# Patient Record
Sex: MALE | Race: WHITE | NOT HISPANIC OR LATINO | Employment: OTHER | URBAN - METROPOLITAN AREA
[De-identification: names, ages, dates, MRNs, and addresses within clinical notes are randomized per-mention and may not be internally consistent; named-entity substitution may affect disease eponyms.]

---

## 2019-04-22 ENCOUNTER — OFFICE VISIT (OUTPATIENT)
Dept: URGENT CARE | Facility: CLINIC | Age: 69
End: 2019-04-22
Payer: MEDICARE

## 2019-04-22 VITALS
HEART RATE: 60 BPM | RESPIRATION RATE: 20 BRPM | WEIGHT: 232 LBS | DIASTOLIC BLOOD PRESSURE: 85 MMHG | OXYGEN SATURATION: 98 % | BODY MASS INDEX: 35.16 KG/M2 | SYSTOLIC BLOOD PRESSURE: 177 MMHG | HEIGHT: 68 IN | TEMPERATURE: 98.1 F

## 2019-04-22 DIAGNOSIS — B96.89 ACUTE BACTERIAL BRONCHITIS: Primary | ICD-10-CM

## 2019-04-22 DIAGNOSIS — J20.8 ACUTE BACTERIAL BRONCHITIS: Primary | ICD-10-CM

## 2019-04-22 PROCEDURE — 99203 OFFICE O/P NEW LOW 30 MIN: CPT | Performed by: EMERGENCY MEDICINE

## 2019-04-22 PROCEDURE — G0463 HOSPITAL OUTPT CLINIC VISIT: HCPCS | Performed by: EMERGENCY MEDICINE

## 2019-04-22 RX ORDER — UBIDECARENONE 75 MG
CAPSULE ORAL DAILY
COMMUNITY

## 2019-04-22 RX ORDER — MAGNESIUM 30 MG
30 TABLET ORAL 2 TIMES DAILY
COMMUNITY

## 2019-04-22 RX ORDER — PANTOPRAZOLE SODIUM 40 MG/1
TABLET, DELAYED RELEASE ORAL
COMMUNITY
Start: 2019-03-20

## 2019-04-22 RX ORDER — SIMVASTATIN 20 MG
TABLET ORAL
COMMUNITY
Start: 2019-03-20

## 2019-04-22 RX ORDER — AZITHROMYCIN 250 MG/1
TABLET, FILM COATED ORAL
Qty: 6 TABLET | Refills: 0 | Status: SHIPPED | OUTPATIENT
Start: 2019-04-22 | End: 2019-04-26

## 2019-04-22 RX ORDER — CANDESARTAN CILEXETIL AND HYDROCHLOROTHIAZIDE 32; 25 MG/1; MG/1
TABLET ORAL
COMMUNITY
Start: 2019-03-20

## 2019-04-22 RX ORDER — INSULIN GLARGINE 100 [IU]/ML
INJECTION, SOLUTION SUBCUTANEOUS
COMMUNITY
Start: 2019-03-20

## 2019-04-22 RX ORDER — PREDNISONE 10 MG/1
TABLET ORAL
Qty: 27 TABLET | Refills: 0 | Status: SHIPPED | OUTPATIENT
Start: 2019-04-22

## 2019-04-22 RX ORDER — INSULIN ASPART 100 [IU]/ML
INJECTION, SOLUTION INTRAVENOUS; SUBCUTANEOUS
COMMUNITY
Start: 2019-03-20

## 2019-06-11 ENCOUNTER — RX ONLY (RX ONLY)
Age: 69
End: 2019-06-11

## 2019-06-11 ENCOUNTER — DOCTOR'S OFFICE (OUTPATIENT)
Dept: URBAN - NONMETROPOLITAN AREA CLINIC 1 | Facility: CLINIC | Age: 69
Setting detail: OPHTHALMOLOGY
End: 2019-06-11
Payer: COMMERCIAL

## 2019-06-11 DIAGNOSIS — H25.013: ICD-10-CM

## 2019-06-11 DIAGNOSIS — H35.373: ICD-10-CM

## 2019-06-11 DIAGNOSIS — H53.2: ICD-10-CM

## 2019-06-11 DIAGNOSIS — E11.9: ICD-10-CM

## 2019-06-11 PROCEDURE — 99204 OFFICE O/P NEW MOD 45 MIN: CPT | Performed by: OPHTHALMOLOGY

## 2019-06-11 PROCEDURE — 92134 CPTRZ OPH DX IMG PST SGM RTA: CPT | Performed by: OPHTHALMOLOGY

## 2019-06-11 ASSESSMENT — REFRACTION_MANIFEST
OS_VA3: 20/
OS_VA2: 20/
OD_VA2: 20/
OU_VA: 20/
OD_VA1: 20/
OS_VA1: 20/
OU_VA: 20/
OD_VA3: 20/
OD_VA3: 20/
OS_VA1: 20/
OS_VA2: 20/
OS_VA3: 20/
OD_VA2: 20/
OD_VA1: 20/

## 2019-06-11 ASSESSMENT — REFRACTION_CURRENTRX
OD_VPRISM: BU
OD_OVR_VA: 20/
OS_ADD: +2.25
OS_VPRISM: BD
OD_OVR_VA: 20/
OS_SPHERE: -0.75
OS_OVR_VA: 20/
OD_OVR_VA: 20/
OS_CYLINDER: -0.25
OD_ADD: +2.25
OS_OVR_VA: 20/
OD_CYLINDER: -1.75
OS_OVR_VA: 20/
OD_SPHERE: -0.75
OD_VPRISM_DIRECTION: PROGS
OD_AXIS: 005
OS_VPRISM_DIRECTION: PROGS
OS_AXIS: 112

## 2019-06-11 ASSESSMENT — CONFRONTATIONAL VISUAL FIELD TEST (CVF)
OD_FINDINGS: FULL
OS_FINDINGS: FULL

## 2019-06-11 ASSESSMENT — SPHEQUIV_DERIVED
OS_SPHEQUIV: -0.25
OD_SPHEQUIV: -0.5

## 2019-06-11 ASSESSMENT — VISUAL ACUITY
OS_BCVA: 20/30-2
OD_BCVA: 20/30-1

## 2019-06-11 ASSESSMENT — REFRACTION_AUTOREFRACTION
OD_CYLINDER: -1.00
OD_AXIS: 070
OS_AXIS: 072
OD_SPHERE: 0.00
OS_CYLINDER: -1.00
OS_SPHERE: +0.25

## 2019-06-21 ENCOUNTER — OFFICE VISIT (OUTPATIENT)
Dept: URGENT CARE | Facility: CLINIC | Age: 69
End: 2019-06-21
Payer: MEDICARE

## 2019-06-21 VITALS
BODY MASS INDEX: 34.86 KG/M2 | HEIGHT: 68 IN | RESPIRATION RATE: 22 BRPM | SYSTOLIC BLOOD PRESSURE: 181 MMHG | DIASTOLIC BLOOD PRESSURE: 88 MMHG | WEIGHT: 230 LBS | TEMPERATURE: 98.7 F | HEART RATE: 71 BPM | OXYGEN SATURATION: 95 %

## 2019-06-21 DIAGNOSIS — J20.8 ACUTE BACTERIAL BRONCHITIS: Primary | ICD-10-CM

## 2019-06-21 DIAGNOSIS — B96.89 ACUTE BACTERIAL BRONCHITIS: Primary | ICD-10-CM

## 2019-06-21 PROCEDURE — G0463 HOSPITAL OUTPT CLINIC VISIT: HCPCS | Performed by: EMERGENCY MEDICINE

## 2019-06-21 PROCEDURE — 99213 OFFICE O/P EST LOW 20 MIN: CPT | Performed by: EMERGENCY MEDICINE

## 2019-06-21 RX ORDER — PREDNISONE 10 MG/1
TABLET ORAL
Qty: 24 TABLET | Refills: 0 | Status: SHIPPED | OUTPATIENT
Start: 2019-06-21

## 2019-06-21 RX ORDER — AZITHROMYCIN 250 MG/1
TABLET, FILM COATED ORAL
Qty: 6 TABLET | Refills: 0 | Status: SHIPPED | OUTPATIENT
Start: 2019-06-21 | End: 2019-06-25

## 2019-06-21 RX ORDER — ALBUTEROL SULFATE 90 UG/1
2 AEROSOL, METERED RESPIRATORY (INHALATION) EVERY 6 HOURS PRN
Qty: 8.5 G | Refills: 0 | Status: SHIPPED | OUTPATIENT
Start: 2019-06-21

## 2019-08-15 ENCOUNTER — DOCTOR'S OFFICE (OUTPATIENT)
Dept: URBAN - NONMETROPOLITAN AREA CLINIC 1 | Facility: CLINIC | Age: 69
Setting detail: OPHTHALMOLOGY
End: 2019-08-15
Payer: COMMERCIAL

## 2019-08-15 DIAGNOSIS — H53.2: ICD-10-CM

## 2019-08-15 DIAGNOSIS — H35.373: ICD-10-CM

## 2019-08-15 DIAGNOSIS — E11.9: ICD-10-CM

## 2019-08-15 DIAGNOSIS — H49.12: ICD-10-CM

## 2019-08-15 DIAGNOSIS — H25.013: ICD-10-CM

## 2019-08-15 PROCEDURE — 92012 INTRM OPH EXAM EST PATIENT: CPT | Performed by: OPHTHALMOLOGY

## 2019-08-15 PROCEDURE — 92060 SENSORIMOTOR EXAMINATION: CPT | Performed by: OPHTHALMOLOGY

## 2019-08-15 PROCEDURE — FRESNELPRI FRESNEL PRISM NEW: Performed by: OPHTHALMOLOGY

## 2019-08-15 ASSESSMENT — CONFRONTATIONAL VISUAL FIELD TEST (CVF)
OD_FINDINGS: FULL
OS_FINDINGS: FULL

## 2019-08-22 ASSESSMENT — REFRACTION_MANIFEST
OS_VA2: 20/
OS_VA1: 20/
OD_VA2: 20/
OD_VA1: 20/
OS_VA1: 20/
OS_VA3: 20/
OU_VA: 20/
OD_VA3: 20/
OD_VA2: 20/
OD_VA1: 20/
OS_VA3: 20/
OD_VA3: 20/
OU_VA: 20/
OS_VA2: 20/

## 2019-08-22 ASSESSMENT — REFRACTION_CURRENTRX
OD_OVR_VA: 20/
OS_CYLINDER: -0.25
OD_VPRISM: BU
OD_VPRISM_DIRECTION: PROGS
OD_ADD: +2.25
OS_ADD: +2.25
OS_OVR_VA: 20/
OS_SPHERE: -0.75
OD_CYLINDER: -1.75
OS_VPRISM_DIRECTION: PROGS
OS_AXIS: 112
OS_VPRISM: BD
OD_SPHERE: -0.75
OS_OVR_VA: 20/
OS_OVR_VA: 20/
OD_OVR_VA: 20/
OD_AXIS: 005
OD_OVR_VA: 20/

## 2019-08-22 ASSESSMENT — VISUAL ACUITY
OS_BCVA: 20/25-1
OD_BCVA: 20/30-1

## 2019-08-22 ASSESSMENT — REFRACTION_AUTOREFRACTION
OD_SPHERE: -0.75
OS_AXIS: 092
OS_CYLINDER: -0.75
OD_CYLINDER: -0.75
OD_AXIS: 030
OS_SPHERE: -0.25

## 2019-08-22 ASSESSMENT — SPHEQUIV_DERIVED
OD_SPHEQUIV: -1.125
OS_SPHEQUIV: -0.625

## 2019-09-19 ENCOUNTER — DOCTOR'S OFFICE (OUTPATIENT)
Dept: URBAN - NONMETROPOLITAN AREA CLINIC 1 | Facility: CLINIC | Age: 69
Setting detail: OPHTHALMOLOGY
End: 2019-09-19
Payer: COMMERCIAL

## 2019-09-19 DIAGNOSIS — H49.12: ICD-10-CM

## 2019-09-19 DIAGNOSIS — H53.2: ICD-10-CM

## 2019-09-19 DIAGNOSIS — H25.013: ICD-10-CM

## 2019-09-19 DIAGNOSIS — H35.373: ICD-10-CM

## 2019-09-19 DIAGNOSIS — E11.9: ICD-10-CM

## 2019-09-19 PROCEDURE — 92012 INTRM OPH EXAM EST PATIENT: CPT | Performed by: OPHTHALMOLOGY

## 2019-09-19 ASSESSMENT — REFRACTION_MANIFEST
OS_VA1: 20/
OS_VA1: 20/
OS_VA3: 20/
OD_VA3: 20/
OS_VA2: 20/
OD_VA1: 20/
OU_VA: 20/
OD_VA2: 20/
OS_VA3: 20/
OD_VA3: 20/
OU_VA: 20/
OD_VA1: 20/
OS_VA2: 20/
OD_VA2: 20/

## 2019-09-19 ASSESSMENT — REFRACTION_AUTOREFRACTION
OD_CYLINDER: -0.75
OD_AXIS: 030
OS_CYLINDER: -0.75
OD_SPHERE: -0.75
OS_SPHERE: -0.25
OS_AXIS: 092

## 2019-09-19 ASSESSMENT — REFRACTION_CURRENTRX
OS_VPRISM: BD
OD_SPHERE: -0.75
OD_VPRISM_DIRECTION: PROGS
OD_VPRISM: BU
OD_CYLINDER: -1.75
OS_AXIS: 112
OS_OVR_VA: 20/
OD_OVR_VA: 20/
OD_AXIS: 005
OD_ADD: +2.25
OS_CYLINDER: -0.25
OS_OVR_VA: 20/
OS_SPHERE: -0.75
OS_ADD: +2.25
OD_OVR_VA: 20/
OS_OVR_VA: 20/
OS_VPRISM_DIRECTION: PROGS
OD_OVR_VA: 20/

## 2019-09-19 ASSESSMENT — VISUAL ACUITY
OS_BCVA: 20/30-1
OD_BCVA: 20/25-1

## 2019-09-19 ASSESSMENT — SPHEQUIV_DERIVED
OD_SPHEQUIV: -1.125
OS_SPHEQUIV: -0.625

## 2019-10-04 ENCOUNTER — DOCTOR'S OFFICE (OUTPATIENT)
Dept: URBAN - NONMETROPOLITAN AREA CLINIC 1 | Facility: CLINIC | Age: 69
Setting detail: OPHTHALMOLOGY
End: 2019-10-04

## 2019-10-04 DIAGNOSIS — H52.223: ICD-10-CM

## 2019-10-04 DIAGNOSIS — H52.4: ICD-10-CM

## 2019-10-04 PROCEDURE — 92015 DETERMINE REFRACTIVE STATE: CPT | Performed by: OPTOMETRIST

## 2019-10-04 ASSESSMENT — REFRACTION_CURRENTRX
OS_SPHERE: -0.75
OD_VPRISM: BU
OS_VPRISM: BD
OD_ADD: +2.25
OS_OVR_VA: 20/
OD_AXIS: 005
OD_VPRISM_DIRECTION: PROGS
OD_CYLINDER: -1.75
OD_OVR_VA: 20/
OS_AXIS: 112
OS_OVR_VA: 20/
OD_SPHERE: -0.75
OS_VPRISM_DIRECTION: PROGS
OD_OVR_VA: 20/
OS_OVR_VA: 20/
OD_OVR_VA: 20/
OS_CYLINDER: -0.25
OS_ADD: +2.25

## 2019-10-04 ASSESSMENT — REFRACTION_AUTOREFRACTION
OD_AXIS: 076
OS_CYLINDER: -0.50
OS_SPHERE: 0.00
OD_CYLINDER: -0.50
OS_AXIS: 071
OD_SPHERE: -0.50

## 2019-10-04 ASSESSMENT — REFRACTION_MANIFEST
OD_VA2: 20/30+2
OD_ADD: +2.50
OD_SPHERE: -0.50
OS_SPHERE: -0.25
OS_VA1: 20/
OS_ADD: +2.50
OS_VA3: 20/
OS_VA3: 20/
OU_VA: 20/
OD_VA2: 20/
OD_VA3: 20/
OS_VA1: 20/25-2
OD_VA1: 20/
OS_CYLINDER: -0.75
OU_VA: 20/
OD_VA1: 20/30+2
OD_AXIS: 075
OD_VA3: 20/
OS_VA2: 20/
OS_VA2: 20/25-2
OS_AXIS: 070
OD_CYLINDER: -1.25

## 2019-10-04 ASSESSMENT — SPHEQUIV_DERIVED
OS_SPHEQUIV: -0.25
OD_SPHEQUIV: -1.125
OS_SPHEQUIV: -0.625
OD_SPHEQUIV: -0.75

## 2019-10-04 ASSESSMENT — VISUAL ACUITY
OS_BCVA: 20/40
OD_BCVA: 20/30-1

## 2019-10-09 ENCOUNTER — DOCTOR'S OFFICE (OUTPATIENT)
Dept: URBAN - NONMETROPOLITAN AREA CLINIC 1 | Facility: CLINIC | Age: 69
Setting detail: OPHTHALMOLOGY
End: 2019-10-09
Payer: COMMERCIAL

## 2019-10-09 ENCOUNTER — OPTICAL OFFICE (OUTPATIENT)
Dept: URBAN - NONMETROPOLITAN AREA CLINIC 4 | Facility: CLINIC | Age: 69
Setting detail: OPHTHALMOLOGY
End: 2019-10-09

## 2019-10-09 DIAGNOSIS — E11.9: ICD-10-CM

## 2019-10-09 DIAGNOSIS — H25.013: ICD-10-CM

## 2019-10-09 DIAGNOSIS — H53.2: ICD-10-CM

## 2019-10-09 DIAGNOSIS — H52.223: ICD-10-CM

## 2019-10-09 DIAGNOSIS — H49.12: ICD-10-CM

## 2019-10-09 PROCEDURE — V2744 TINT PHOTOCHROMATIC LENS/ES: HCPCS | Performed by: OPHTHALMOLOGY

## 2019-10-09 PROCEDURE — V2781 PROGRESSIVE LENS PER LENS: HCPCS | Performed by: OPHTHALMOLOGY

## 2019-10-09 PROCEDURE — V2715 PRISM LENS/ES: HCPCS | Performed by: OPHTHALMOLOGY

## 2019-10-09 PROCEDURE — V2750 ANTI-REFLECTIVE COATING: HCPCS | Performed by: OPHTHALMOLOGY

## 2019-10-09 PROCEDURE — 99213 OFFICE O/P EST LOW 20 MIN: CPT | Performed by: OPHTHALMOLOGY

## 2019-10-09 PROCEDURE — V2020 VISION SVCS FRAMES PURCHASES: HCPCS | Performed by: OPHTHALMOLOGY

## 2019-10-09 ASSESSMENT — REFRACTION_MANIFEST
OD_AXIS: 075
OD_VA2: 20/30+2
OS_VA3: 20/
OU_VA: 20/
OD_CYLINDER: -1.25
OS_CYLINDER: -0.75
OD_VA3: 20/
OS_VA3: 20/
OD_VA1: 20/30+2
OS_ADD: +2.50
OS_VPRISM: BD
OS_VA1: 20/25-2
OS_VA1: 20/
OS_SPHERE: -0.25
OS_VA2: 20/25-2
OD_ADD: +2.50
OD_VA3: 20/
OS_AXIS: 070
OD_VPRISM: BU
OS_VA2: 20/
OD_SPHERE: -0.50
OD_VA2: 20/
OU_VA: 20/
OD_VA1: 20/

## 2019-10-09 ASSESSMENT — CONFRONTATIONAL VISUAL FIELD TEST (CVF)
OD_FINDINGS: FULL
OS_FINDINGS: FULL

## 2019-10-09 ASSESSMENT — SPHEQUIV_DERIVED
OS_SPHEQUIV: -0.625
OD_SPHEQUIV: -1.125

## 2019-10-22 ASSESSMENT — REFRACTION_CURRENTRX
OS_AXIS: 112
OS_OVR_VA: 20/
OS_VPRISM: BD
OD_CYLINDER: -1.75
OS_OVR_VA: 20/
OS_VPRISM_DIRECTION: PROGS
OS_CYLINDER: -0.25
OD_SPHERE: -0.75
OD_AXIS: 005
OS_ADD: +2.25
OS_SPHERE: -0.75
OD_VPRISM_DIRECTION: PROGS
OD_OVR_VA: 20/
OS_OVR_VA: 20/
OD_VPRISM: BU
OD_ADD: +2.25

## 2019-10-22 ASSESSMENT — SPHEQUIV_DERIVED
OS_SPHEQUIV: -0.625
OD_SPHEQUIV: -1.25

## 2019-10-22 ASSESSMENT — REFRACTION_AUTOREFRACTION
OD_CYLINDER: -0.50
OS_SPHERE: -0.25
OS_AXIS: 058
OS_CYLINDER: -0.75
OD_SPHERE: -1.00
OD_AXIS: 064

## 2019-10-22 ASSESSMENT — VISUAL ACUITY
OD_BCVA: 20/30-2
OS_BCVA: 20/40

## 2019-12-11 ENCOUNTER — DOCTOR'S OFFICE (OUTPATIENT)
Dept: URBAN - NONMETROPOLITAN AREA CLINIC 1 | Facility: CLINIC | Age: 69
Setting detail: OPHTHALMOLOGY
End: 2019-12-11
Payer: COMMERCIAL

## 2019-12-11 DIAGNOSIS — H25.013: ICD-10-CM

## 2019-12-11 DIAGNOSIS — H35.373: ICD-10-CM

## 2019-12-11 DIAGNOSIS — E11.9: ICD-10-CM

## 2019-12-11 PROCEDURE — 92014 COMPRE OPH EXAM EST PT 1/>: CPT | Performed by: OPHTHALMOLOGY

## 2019-12-11 PROCEDURE — 92134 CPTRZ OPH DX IMG PST SGM RTA: CPT | Performed by: OPHTHALMOLOGY

## 2019-12-11 ASSESSMENT — REFRACTION_CURRENTRX
OS_VPRISM_DIRECTION: PROGS
OD_OVR_VA: 20/
OS_VPRISM: BD
OS_OVR_VA: 20/
OS_CYLINDER: -0.25
OD_CYLINDER: -1.75
OS_OVR_VA: 20/
OS_ADD: +2.25
OS_SPHERE: -0.75
OD_AXIS: 005
OD_ADD: +2.25
OD_VPRISM: BU
OS_AXIS: 112
OD_OVR_VA: 20/
OD_SPHERE: -0.75
OD_VPRISM_DIRECTION: PROGS
OD_OVR_VA: 20/
OS_OVR_VA: 20/

## 2019-12-11 ASSESSMENT — REFRACTION_AUTOREFRACTION
OS_CYLINDER: -0.50
OS_AXIS: 065
OD_CYLINDER: -1.50
OD_AXIS: 084
OD_SPHERE: -0.25
OS_SPHERE: -0.25

## 2019-12-11 ASSESSMENT — REFRACTION_MANIFEST
OD_VA3: 20/
OS_VA3: 20/
OS_CYLINDER: -0.75
OD_CYLINDER: -1.25
OD_VPRISM: BU
OS_VA3: 20/
OS_VA1: 20/25-2
OD_ADD: +2.50
OS_ADD: +2.50
OS_AXIS: 070
OD_SPHERE: -0.50
OS_VPRISM: BD
OD_VA1: 20/30+2
OD_VA2: 20/30+2
OD_VA1: 20/
OS_SPHERE: -0.25
OS_VA2: 20/25-2
OD_VA2: 20/
OU_VA: 20/
OS_VA1: 20/
OD_AXIS: 075
OU_VA: 20/
OS_VA2: 20/
OD_VA3: 20/

## 2019-12-11 ASSESSMENT — VISUAL ACUITY
OD_BCVA: 20/25-1
OS_BCVA: 20/40+2

## 2019-12-11 ASSESSMENT — SPHEQUIV_DERIVED
OD_SPHEQUIV: -1.125
OS_SPHEQUIV: -0.625
OS_SPHEQUIV: -0.5
OD_SPHEQUIV: -1

## 2019-12-11 ASSESSMENT — CONFRONTATIONAL VISUAL FIELD TEST (CVF)
OS_FINDINGS: FULL
OD_FINDINGS: FULL

## 2020-06-16 ENCOUNTER — DOCTOR'S OFFICE (OUTPATIENT)
Dept: URBAN - NONMETROPOLITAN AREA CLINIC 1 | Facility: CLINIC | Age: 70
Setting detail: OPHTHALMOLOGY
End: 2020-06-16
Payer: COMMERCIAL

## 2020-06-16 DIAGNOSIS — E11.9: ICD-10-CM

## 2020-06-16 DIAGNOSIS — H25.013: ICD-10-CM

## 2020-06-16 DIAGNOSIS — H25.13: ICD-10-CM

## 2020-06-16 DIAGNOSIS — H35.373: ICD-10-CM

## 2020-06-16 PROCEDURE — 92134 CPTRZ OPH DX IMG PST SGM RTA: CPT | Performed by: OPHTHALMOLOGY

## 2020-06-16 PROCEDURE — 92014 COMPRE OPH EXAM EST PT 1/>: CPT | Performed by: OPHTHALMOLOGY

## 2020-06-16 ASSESSMENT — REFRACTION_MANIFEST
OD_CYLINDER: -1.25
OD_ADD: +2.50
OD_VPRISM: BU
OS_AXIS: 070
OS_VPRISM: BD
OD_AXIS: 075
OS_CYLINDER: -0.75
OS_SPHERE: -0.25
OD_VA2: 20/30+2
OD_VA1: 20/30+2
OS_VA1: 20/25-2
OD_SPHERE: -0.50
OS_VA2: 20/25-2
OS_ADD: +2.50

## 2020-06-16 ASSESSMENT — REFRACTION_CURRENTRX
OD_OVR_VA: 20/
OD_AXIS: 079
OS_ADD: +2.50
OS_AXIS: 055
OD_VPRISM_DIRECTION: PROGS
OS_VPRISM: BD
OS_CYLINDER: -0.75
OS_SPHERE: -0.25
OD_VPRISM: BU
OS_OVR_VA: 20/
OS_VPRISM_DIRECTION: PROGS
OD_ADD: +2.50
OD_CYLINDER: -1.25
OD_SPHERE: -0.25

## 2020-06-16 ASSESSMENT — SPHEQUIV_DERIVED
OS_SPHEQUIV: -0.625
OD_SPHEQUIV: -1.125
OS_SPHEQUIV: 0
OD_SPHEQUIV: -1.375

## 2020-06-16 ASSESSMENT — REFRACTION_AUTOREFRACTION
OD_CYLINDER: -0.25
OS_SPHERE: 0.00
OD_SPHERE: -1.25
OD_AXIS: 086
OS_CYLINDER: 0.00
OS_AXIS: 000

## 2020-06-16 ASSESSMENT — CONFRONTATIONAL VISUAL FIELD TEST (CVF)
OS_FINDINGS: FULL
OD_FINDINGS: FULL

## 2020-06-16 ASSESSMENT — VISUAL ACUITY
OD_BCVA: 20/25-2
OS_BCVA: 20/40+2

## 2021-03-09 DIAGNOSIS — Z23 ENCOUNTER FOR IMMUNIZATION: ICD-10-CM

## 2021-06-15 ENCOUNTER — DOCTOR'S OFFICE (OUTPATIENT)
Dept: URBAN - NONMETROPOLITAN AREA CLINIC 1 | Facility: CLINIC | Age: 71
Setting detail: OPHTHALMOLOGY
End: 2021-06-15
Payer: COMMERCIAL

## 2021-06-15 DIAGNOSIS — H01.001: ICD-10-CM

## 2021-06-15 DIAGNOSIS — H25.013: ICD-10-CM

## 2021-06-15 DIAGNOSIS — D23.122: ICD-10-CM

## 2021-06-15 DIAGNOSIS — E11.9: ICD-10-CM

## 2021-06-15 DIAGNOSIS — H25.13: ICD-10-CM

## 2021-06-15 DIAGNOSIS — H35.373: ICD-10-CM

## 2021-06-15 DIAGNOSIS — H01.004: ICD-10-CM

## 2021-06-15 PROCEDURE — 92134 CPTRZ OPH DX IMG PST SGM RTA: CPT | Performed by: OPHTHALMOLOGY

## 2021-06-15 PROCEDURE — 92014 COMPRE OPH EXAM EST PT 1/>: CPT | Performed by: OPHTHALMOLOGY

## 2021-06-15 ASSESSMENT — REFRACTION_CURRENTRX
OS_ADD: +2.50
OD_VPRISM: BU
OS_VPRISM_DIRECTION: PROGS
OD_AXIS: 070
OS_CYLINDER: -2.00
OD_SPHERE: PLANO
OS_AXIS: 116
OS_VPRISM: BD
OS_OVR_VA: 20/
OD_VPRISM_DIRECTION: PROGS
OD_ADD: +2.50
OD_OVR_VA: 20/
OD_CYLINDER: -1.50
OS_SPHERE: +1.25

## 2021-06-15 ASSESSMENT — REFRACTION_MANIFEST
OD_VPRISM: BU
OD_SPHERE: -0.50
OS_VA1: 20/25-2
OD_VA1: 20/30+2
OS_AXIS: 070
OD_AXIS: 075
OS_CYLINDER: -0.75
OS_SPHERE: -0.25
OS_ADD: +2.50
OS_VA2: 20/25-2
OD_CYLINDER: -1.25
OS_VPRISM: BD
OD_VA2: 20/30+2
OD_ADD: +2.50

## 2021-06-15 ASSESSMENT — AXIALLENGTH_DERIVED
OD_AL: 24.4753
OD_AL: 24.4753
OS_AL: 24.1677
OS_AL: 24.219

## 2021-06-15 ASSESSMENT — SPHEQUIV_DERIVED
OS_SPHEQUIV: -0.5
OD_SPHEQUIV: -1.125
OD_SPHEQUIV: -1.125
OS_SPHEQUIV: -0.625

## 2021-06-15 ASSESSMENT — KERATOMETRY
OS_K1POWER_DIOPTERS: 42.50
OD_K2POWER_DIOPTERS: 43.00
OD_AXISANGLE_DEGREES: 139
OD_K1POWER_DIOPTERS: 41.75
OS_K2POWER_DIOPTERS: 42.50

## 2021-06-15 ASSESSMENT — CONFRONTATIONAL VISUAL FIELD TEST (CVF)
OD_FINDINGS: FULL
OS_FINDINGS: FULL

## 2021-06-15 ASSESSMENT — LID EXAM ASSESSMENTS
OD_BLEPHARITIS: RUL 1+
OS_BLEPHARITIS: LUL 1+

## 2021-06-15 ASSESSMENT — REFRACTION_AUTOREFRACTION
OD_SPHERE: -0.50
OD_CYLINDER: -1.25
OS_SPHERE: -0.50
OD_AXIS: 065
OS_CYLINDER: 0.00

## 2021-06-15 ASSESSMENT — VISUAL ACUITY
OS_BCVA: 20/40
OD_BCVA: 20/30-2

## 2021-06-17 ENCOUNTER — OPTICAL OFFICE (OUTPATIENT)
Dept: URBAN - NONMETROPOLITAN AREA CLINIC 4 | Facility: CLINIC | Age: 71
Setting detail: OPHTHALMOLOGY
End: 2021-06-17

## 2021-06-17 ENCOUNTER — DOCTOR'S OFFICE (OUTPATIENT)
Dept: URBAN - NONMETROPOLITAN AREA CLINIC 1 | Facility: CLINIC | Age: 71
Setting detail: OPHTHALMOLOGY
End: 2021-06-17

## 2021-06-17 DIAGNOSIS — H52.4: ICD-10-CM

## 2021-06-17 DIAGNOSIS — H52.223: ICD-10-CM

## 2021-06-17 PROCEDURE — V2781 PROGRESSIVE LENS PER LENS: HCPCS | Performed by: OPTOMETRIST

## 2021-06-17 PROCEDURE — 92015 DETERMINE REFRACTIVE STATE: CPT | Performed by: OPTOMETRIST

## 2021-06-17 PROCEDURE — V2715 PRISM LENS/ES: HCPCS | Performed by: OPTOMETRIST

## 2021-06-17 PROCEDURE — V2750 ANTI-REFLECTIVE COATING: HCPCS | Performed by: OPTOMETRIST

## 2021-06-17 PROCEDURE — V2744 TINT PHOTOCHROMATIC LENS/ES: HCPCS | Performed by: OPTOMETRIST

## 2021-06-17 PROCEDURE — V2020 VISION SVCS FRAMES PURCHASES: HCPCS | Performed by: OPTOMETRIST

## 2021-06-17 ASSESSMENT — REFRACTION_CURRENTRX
OD_OVR_VA: 20/
OD_CYLINDER: -1.25
OS_CYLINDER: -0.50
OD_HPRISM: 0.5
OS_VPRISM_DIRECTION: PROGS
OD_AXIS: 073
OS_AXIS: 077
OS_VPRISM: BD
OS_OVR_VA: 20/
OD_ADD: +2.50
OD_SPHERE: -0.50
OS_SPHERE: -0.25
OD_VPRISM: BU
OD_VPRISM_DIRECTION: PROGS
OS_ADD: +2.50
OD_HPRISM_DIRECTION: BO

## 2021-06-17 ASSESSMENT — REFRACTION_AUTOREFRACTION
OD_CYLINDER: -0.75
OD_AXIS: 034
OS_AXIS: 006
OS_CYLINDER: -0.25
OS_SPHERE: -0.50
OD_SPHERE: -1.25

## 2021-06-17 ASSESSMENT — SPHEQUIV_DERIVED
OD_SPHEQUIV: -1.625
OS_SPHEQUIV: -0.625
OD_SPHEQUIV: -1.625
OS_SPHEQUIV: -0.625

## 2021-06-17 ASSESSMENT — VISUAL ACUITY
OS_BCVA: 20/40+2
OD_BCVA: 20/30+2

## 2021-06-17 ASSESSMENT — AXIALLENGTH_DERIVED
OD_AL: 24.6868
OS_AL: 24.219
OD_AL: 24.6868
OS_AL: 24.219

## 2021-06-17 ASSESSMENT — REFRACTION_MANIFEST
OS_CYLINDER: -0.25
OS_ADD: +2.50
OS_VA1: 20/25-2
OD_VA1: 20/30+2
OD_SPHERE: -1.00
OD_CYLINDER: -1.25
OD_ADD: +2.50
OS_SPHERE: -0.50
OS_VPRISM: BD
OD_VPRISM: BU
OD_AXIS: 075
OS_AXIS: 010

## 2021-06-17 ASSESSMENT — KERATOMETRY
OD_K2POWER_DIOPTERS: 43.00
OS_K1POWER_DIOPTERS: 42.50
OD_K1POWER_DIOPTERS: 41.75
OS_K2POWER_DIOPTERS: 42.50
OD_AXISANGLE_DEGREES: 139

## 2021-09-10 ENCOUNTER — DOCTOR'S OFFICE (OUTPATIENT)
Dept: URBAN - NONMETROPOLITAN AREA CLINIC 1 | Facility: CLINIC | Age: 71
Setting detail: OPHTHALMOLOGY
End: 2021-09-10
Payer: COMMERCIAL

## 2021-09-10 DIAGNOSIS — D23.112: ICD-10-CM

## 2021-09-10 DIAGNOSIS — H02.834: ICD-10-CM

## 2021-09-10 DIAGNOSIS — H02.831: ICD-10-CM

## 2021-09-10 DIAGNOSIS — H01.001: ICD-10-CM

## 2021-09-10 DIAGNOSIS — H01.004: ICD-10-CM

## 2021-09-10 PROCEDURE — 92285 EXTERNAL OCULAR PHOTOGRAPHY: CPT | Performed by: OPHTHALMOLOGY

## 2021-09-10 PROCEDURE — 92012 INTRM OPH EXAM EST PATIENT: CPT | Performed by: OPHTHALMOLOGY

## 2021-09-10 ASSESSMENT — REFRACTION_AUTOREFRACTION
OD_CYLINDER: -0.75
OS_CYLINDER: -0.25
OD_AXIS: 034
OS_AXIS: 006
OS_SPHERE: -0.50
OD_SPHERE: -1.25

## 2021-09-10 ASSESSMENT — REFRACTION_MANIFEST
OS_CYLINDER: -0.25
OS_AXIS: 010
OD_VA1: 20/30+2
OD_VPRISM: BU
OS_VPRISM: BD
OS_ADD: +2.50
OS_VA1: 20/25-2
OD_CYLINDER: -1.25
OD_AXIS: 075
OD_ADD: +2.50
OD_SPHERE: -1.00
OS_SPHERE: -0.50

## 2021-09-10 ASSESSMENT — REFRACTION_CURRENTRX
OS_ADD: +2.50
OS_VPRISM: BD
OD_SPHERE: -0.50
OD_AXIS: 073
OD_HPRISM: 0.5
OS_AXIS: 077
OD_CYLINDER: -1.25
OD_VPRISM: BU
OD_OVR_VA: 20/
OS_CYLINDER: -0.50
OD_HPRISM_DIRECTION: BO
OS_SPHERE: -0.25
OS_OVR_VA: 20/
OD_ADD: +2.50
OS_VPRISM_DIRECTION: PROGS
OD_VPRISM_DIRECTION: PROGS

## 2021-09-10 ASSESSMENT — LID POSITION - DERMATOCHALASIS
OD_DERMATOCHALASIS: RUL 1+
OS_DERMATOCHALASIS: LUL 1+

## 2021-09-10 ASSESSMENT — CONFRONTATIONAL VISUAL FIELD TEST (CVF)
OD_FINDINGS: FULL
OS_FINDINGS: FULL

## 2021-09-10 ASSESSMENT — LID EXAM ASSESSMENTS
OS_MEIBOMITIS: LUL 1+
OS_BLEPHARITIS: LUL T
OD_BLEPHARITIS: RUL T
OD_MEIBOMITIS: RUL 1+

## 2021-09-10 ASSESSMENT — KERATOMETRY
OD_K1POWER_DIOPTERS: 41.75
OD_AXISANGLE_DEGREES: 139
OD_K2POWER_DIOPTERS: 43.00
OS_K2POWER_DIOPTERS: 42.50
OS_K1POWER_DIOPTERS: 42.50

## 2021-09-10 ASSESSMENT — AXIALLENGTH_DERIVED
OD_AL: 24.6868
OD_AL: 24.6868
OS_AL: 24.219
OS_AL: 24.219

## 2021-09-10 ASSESSMENT — SPHEQUIV_DERIVED
OS_SPHEQUIV: -0.625
OD_SPHEQUIV: -1.625
OD_SPHEQUIV: -1.625
OS_SPHEQUIV: -0.625

## 2021-09-10 ASSESSMENT — VISUAL ACUITY
OD_BCVA: 20/30+2
OS_BCVA: 20/40

## 2021-11-16 ENCOUNTER — AMBUL SURGICAL CARE (OUTPATIENT)
Dept: URBAN - NONMETROPOLITAN AREA SURGERY 1 | Facility: SURGERY | Age: 71
Setting detail: OPHTHALMOLOGY
End: 2021-11-16
Payer: COMMERCIAL

## 2021-11-16 DIAGNOSIS — D23.112: ICD-10-CM

## 2021-11-16 DIAGNOSIS — D21.0: ICD-10-CM

## 2021-11-16 PROCEDURE — G8918 PT W/O PREOP ORDER IV AB PRO: HCPCS | Performed by: OPHTHALMOLOGY

## 2021-11-16 PROCEDURE — G8918 PT W/O PREOP ORDER IV AB PRO: HCPCS | Performed by: CLINIC/CENTER

## 2021-11-16 PROCEDURE — G8907 PT DOC NO EVENTS ON DISCHARG: HCPCS | Performed by: OPHTHALMOLOGY

## 2021-11-16 PROCEDURE — 67810 INCAL BX EYELID SKN LID MRGN: CPT | Performed by: CLINIC/CENTER

## 2021-11-16 PROCEDURE — 67810 INCAL BX EYELID SKN LID MRGN: CPT | Performed by: OPHTHALMOLOGY

## 2021-11-16 PROCEDURE — 67840 REMOVE EYELID LESION: CPT | Performed by: OPHTHALMOLOGY

## 2021-11-16 PROCEDURE — G8907 PT DOC NO EVENTS ON DISCHARG: HCPCS | Performed by: CLINIC/CENTER

## 2021-11-16 PROCEDURE — 67840 REMOVE EYELID LESION: CPT | Performed by: CLINIC/CENTER

## 2021-11-26 ENCOUNTER — DOCTOR'S OFFICE (OUTPATIENT)
Dept: URBAN - NONMETROPOLITAN AREA CLINIC 1 | Facility: CLINIC | Age: 71
Setting detail: OPHTHALMOLOGY
End: 2021-11-26
Payer: COMMERCIAL

## 2021-11-26 DIAGNOSIS — D23.112: ICD-10-CM

## 2021-11-26 PROCEDURE — 99024 POSTOP FOLLOW-UP VISIT: CPT | Performed by: OPHTHALMOLOGY

## 2021-11-26 ASSESSMENT — AXIALLENGTH_DERIVED
OS_AL: 24.219
OD_AL: 24.6868
OD_AL: 24.6868
OS_AL: 24.219

## 2021-11-26 ASSESSMENT — REFRACTION_CURRENTRX
OS_OVR_VA: 20/
OD_OVR_VA: 20/
OD_HPRISM: 0.5
OD_AXIS: 073
OS_AXIS: 077
OD_HPRISM_DIRECTION: BO
OS_CYLINDER: -0.50
OS_SPHERE: -0.25
OD_VPRISM: BU
OS_ADD: +2.50
OD_CYLINDER: -1.25
OD_VPRISM_DIRECTION: PROGS
OS_VPRISM: BD
OD_SPHERE: -0.50
OS_VPRISM_DIRECTION: PROGS
OD_ADD: +2.50

## 2021-11-26 ASSESSMENT — LID EXAM ASSESSMENTS
OD_BLEPHARITIS: RUL T
OD_MEIBOMITIS: RUL 1+
OS_MEIBOMITIS: LUL 1+
OS_BLEPHARITIS: LUL T

## 2021-11-26 ASSESSMENT — LID POSITION - DERMATOCHALASIS
OS_DERMATOCHALASIS: LUL 1+
OD_DERMATOCHALASIS: RUL 1+

## 2021-11-26 ASSESSMENT — SPHEQUIV_DERIVED
OD_SPHEQUIV: -1.625
OS_SPHEQUIV: -0.625
OS_SPHEQUIV: -0.625
OD_SPHEQUIV: -1.625

## 2021-11-26 ASSESSMENT — REFRACTION_MANIFEST
OS_SPHERE: -0.50
OD_VPRISM: BU
OD_CYLINDER: -1.25
OD_ADD: +2.50
OS_AXIS: 010
OD_VA1: 20/30+2
OD_AXIS: 075
OS_ADD: +2.50
OS_CYLINDER: -0.25
OD_SPHERE: -1.00
OS_VA1: 20/25-2
OS_VPRISM: BD

## 2021-11-26 ASSESSMENT — KERATOMETRY
OD_K1POWER_DIOPTERS: 41.75
OS_K2POWER_DIOPTERS: 42.50
OS_K1POWER_DIOPTERS: 42.50
OD_K2POWER_DIOPTERS: 43.00
OD_AXISANGLE_DEGREES: 139

## 2021-11-26 ASSESSMENT — REFRACTION_AUTOREFRACTION
OS_SPHERE: -0.50
OD_CYLINDER: -0.75
OS_AXIS: 006
OS_CYLINDER: -0.25
OD_AXIS: 034
OD_SPHERE: -1.25

## 2021-11-26 ASSESSMENT — VISUAL ACUITY
OD_BCVA: 20/25-1
OS_BCVA: 20/30

## 2022-01-14 ENCOUNTER — DOCTOR'S OFFICE (OUTPATIENT)
Dept: URBAN - NONMETROPOLITAN AREA CLINIC 1 | Facility: CLINIC | Age: 72
Setting detail: OPHTHALMOLOGY
End: 2022-01-14
Payer: COMMERCIAL

## 2022-01-14 DIAGNOSIS — D23.112: ICD-10-CM

## 2022-01-14 DIAGNOSIS — H02.89: ICD-10-CM

## 2022-01-14 PROCEDURE — 99213 OFFICE O/P EST LOW 20 MIN: CPT | Performed by: OPHTHALMOLOGY

## 2022-01-14 ASSESSMENT — LID EXAM ASSESSMENTS
OS_MEIBOMITIS: LUL 1+
OS_BLEPHARITIS: LUL T
OS_COMMENTS: OS UL COMMENTS
OD_COMMENTS: OS UL COMMENTS
OD_MEIBOMITIS: RUL 1+
OD_BLEPHARITIS: RUL T

## 2022-01-14 ASSESSMENT — AXIALLENGTH_DERIVED
OS_AL: 24.219
OS_AL: 24.219
OD_AL: 24.6868
OD_AL: 24.6868

## 2022-01-14 ASSESSMENT — REFRACTION_MANIFEST
OS_CYLINDER: -0.25
OD_SPHERE: -1.00
OS_SPHERE: -0.50
OD_AXIS: 075
OD_ADD: +2.50
OS_VPRISM: BD
OD_CYLINDER: -1.25
OD_VPRISM: BU
OS_ADD: +2.50
OS_AXIS: 010
OD_VA1: 20/30+2
OS_VA1: 20/25-2

## 2022-01-14 ASSESSMENT — REFRACTION_CURRENTRX
OD_VPRISM_DIRECTION: PROGS
OD_VPRISM: BU
OD_OVR_VA: 20/
OS_ADD: +2.50
OD_SPHERE: -0.50
OS_VPRISM: BD
OS_OVR_VA: 20/
OD_HPRISM_DIRECTION: BO
OD_HPRISM: 0.5
OD_AXIS: 073
OS_VPRISM_DIRECTION: PROGS
OS_AXIS: 077
OD_ADD: +2.50
OS_SPHERE: -0.25
OD_CYLINDER: -1.25
OS_CYLINDER: -0.50

## 2022-01-14 ASSESSMENT — REFRACTION_AUTOREFRACTION
OD_SPHERE: -1.25
OD_CYLINDER: -0.75
OS_AXIS: 006
OS_CYLINDER: -0.25
OS_SPHERE: -0.50
OD_AXIS: 034

## 2022-01-14 ASSESSMENT — VISUAL ACUITY
OS_BCVA: 20/30
OD_BCVA: 20/20-2

## 2022-01-14 ASSESSMENT — TONOMETRY
OS_IOP_MMHG: 19
OD_IOP_MMHG: 19

## 2022-01-14 ASSESSMENT — SPHEQUIV_DERIVED
OD_SPHEQUIV: -1.625
OS_SPHEQUIV: -0.625
OD_SPHEQUIV: -1.625
OS_SPHEQUIV: -0.625

## 2022-01-14 ASSESSMENT — KERATOMETRY
OS_K2POWER_DIOPTERS: 42.50
OD_AXISANGLE_DEGREES: 139
OD_K1POWER_DIOPTERS: 41.75
OS_K1POWER_DIOPTERS: 42.50
OD_K2POWER_DIOPTERS: 43.00

## 2022-01-14 ASSESSMENT — LID POSITION - DERMATOCHALASIS
OS_DERMATOCHALASIS: LUL 1+
OD_DERMATOCHALASIS: RUL 1+

## 2022-06-17 ENCOUNTER — DOCTOR'S OFFICE (OUTPATIENT)
Dept: URBAN - NONMETROPOLITAN AREA CLINIC 1 | Facility: CLINIC | Age: 72
Setting detail: OPHTHALMOLOGY
End: 2022-06-17
Payer: COMMERCIAL

## 2022-06-17 ENCOUNTER — RX ONLY (RX ONLY)
Age: 72
End: 2022-06-17

## 2022-06-17 DIAGNOSIS — H35.373: ICD-10-CM

## 2022-06-17 DIAGNOSIS — H25.013: ICD-10-CM

## 2022-06-17 DIAGNOSIS — H02.89: ICD-10-CM

## 2022-06-17 DIAGNOSIS — E11.9: ICD-10-CM

## 2022-06-17 DIAGNOSIS — D23.112: ICD-10-CM

## 2022-06-17 DIAGNOSIS — H25.13: ICD-10-CM

## 2022-06-17 PROBLEM — H53.2 DIPLOPIA: Status: ACTIVE | Noted: 2019-06-11

## 2022-06-17 PROBLEM — H02.831 DERMATOCHALASIS; RIGHT UPPER LID, LEFT UPPER LID: Status: ACTIVE | Noted: 2021-09-10

## 2022-06-17 PROBLEM — H02.834 DERMATOCHALASIS; RIGHT UPPER LID, LEFT UPPER LID: Status: ACTIVE | Noted: 2021-09-10

## 2022-06-17 PROBLEM — H01.001 BLEPHARITIS; RIGHT UPPER LID, LEFT UPPER LID: Status: RESOLVED | Noted: 2021-06-15 | Resolved: 2022-06-17

## 2022-06-17 PROBLEM — H52.223 ASTIGMATISM, REGULAR; BOTH EYES: Status: ACTIVE | Noted: 2019-10-04

## 2022-06-17 PROBLEM — H01.004 BLEPHARITIS; RIGHT UPPER LID, LEFT UPPER LID: Status: RESOLVED | Noted: 2021-06-15 | Resolved: 2022-06-17

## 2022-06-17 PROCEDURE — 92134 CPTRZ OPH DX IMG PST SGM RTA: CPT | Performed by: OPHTHALMOLOGY

## 2022-06-17 PROCEDURE — 99214 OFFICE O/P EST MOD 30 MIN: CPT | Performed by: OPHTHALMOLOGY

## 2022-06-17 ASSESSMENT — REFRACTION_CURRENTRX
OS_SPHERE: -0.25
OD_VPRISM_DIRECTION: PROGS
OS_CYLINDER: -0.50
OS_ADD: +2.50
OD_AXIS: 073
OD_CYLINDER: -1.25
OS_AXIS: 077
OD_HPRISM_DIRECTION: BO
OS_VPRISM: BD
OS_OVR_VA: 20/
OD_ADD: +2.50
OS_VPRISM_DIRECTION: PROGS
OD_OVR_VA: 20/
OD_SPHERE: -0.50
OD_VPRISM: BU
OD_HPRISM: 0.5

## 2022-06-17 ASSESSMENT — REFRACTION_MANIFEST
OD_SPHERE: -1.00
OS_AXIS: 010
OS_CYLINDER: -0.25
OS_VA1: 20/25-2
OD_VPRISM: BU
OS_SPHERE: -0.50
OD_CYLINDER: -1.25
OD_AXIS: 075
OS_VPRISM: BD
OS_ADD: +2.50
OD_ADD: +2.50
OD_VA1: 20/30+2

## 2022-06-17 ASSESSMENT — LID EXAM ASSESSMENTS
OS_BLEPHARITIS: LUL T
OD_BLEPHARITIS: RUL T
OD_MEIBOMITIS: RUL 1+
OD_COMMENTS: OS UL COMMENTS
OS_COMMENTS: OS UL COMMENTS
OS_MEIBOMITIS: LUL 1+

## 2022-06-17 ASSESSMENT — AXIALLENGTH_DERIVED
OD_AL: 24.6868
OD_AL: 24.6868
OS_AL: 24.219
OS_AL: 24.219

## 2022-06-17 ASSESSMENT — LID POSITION - DERMATOCHALASIS
OD_DERMATOCHALASIS: RUL 1+
OS_DERMATOCHALASIS: LUL 1+

## 2022-06-17 ASSESSMENT — SPHEQUIV_DERIVED
OS_SPHEQUIV: -0.625
OS_SPHEQUIV: -0.625
OD_SPHEQUIV: -1.625
OD_SPHEQUIV: -1.625

## 2022-06-17 ASSESSMENT — REFRACTION_AUTOREFRACTION
OD_AXIS: 034
OS_SPHERE: -0.50
OD_SPHERE: -1.25
OS_CYLINDER: -0.25
OS_AXIS: 006
OD_CYLINDER: -0.75

## 2022-06-17 ASSESSMENT — TONOMETRY
OD_IOP_MMHG: 15
OS_IOP_MMHG: 16

## 2022-06-17 ASSESSMENT — VISUAL ACUITY
OS_BCVA: 20/30+1
OD_BCVA: 20/25-1

## 2022-06-17 ASSESSMENT — KERATOMETRY
OS_K1POWER_DIOPTERS: 42.50
OS_K2POWER_DIOPTERS: 42.50
OD_AXISANGLE_DEGREES: 139
OD_K1POWER_DIOPTERS: 41.75
OD_K2POWER_DIOPTERS: 43.00

## 2022-06-17 ASSESSMENT — CONFRONTATIONAL VISUAL FIELD TEST (CVF)
OS_FINDINGS: FULL
OD_FINDINGS: FULL

## 2022-09-07 ENCOUNTER — TRANSCRIBE ORDERS (OUTPATIENT)
Dept: CARDIOLOGY CLINIC | Facility: CLINIC | Age: 72
End: 2022-09-07

## 2022-09-07 DIAGNOSIS — R35.1 NOCTURIA: Primary | ICD-10-CM

## 2022-09-07 DIAGNOSIS — N40.0 BPH WITHOUT OBSTRUCTION/LOWER URINARY TRACT SYMPTOMS: ICD-10-CM

## 2022-11-02 RX ORDER — ROSUVASTATIN CALCIUM 20 MG/1
20 TABLET, COATED ORAL DAILY
COMMUNITY
Start: 2022-08-30

## 2022-11-02 RX ORDER — SILDENAFIL CITRATE 20 MG/1
TABLET ORAL
COMMUNITY
Start: 2022-08-30 | End: 2022-11-07 | Stop reason: ALTCHOICE

## 2022-11-07 ENCOUNTER — OFFICE VISIT (OUTPATIENT)
Dept: UROLOGY | Facility: CLINIC | Age: 72
End: 2022-11-07

## 2022-11-07 VITALS
OXYGEN SATURATION: 98 % | HEIGHT: 68 IN | BODY MASS INDEX: 33.65 KG/M2 | HEART RATE: 70 BPM | DIASTOLIC BLOOD PRESSURE: 80 MMHG | WEIGHT: 222 LBS | SYSTOLIC BLOOD PRESSURE: 162 MMHG

## 2022-11-07 DIAGNOSIS — N40.0 BPH WITHOUT OBSTRUCTION/LOWER URINARY TRACT SYMPTOMS: Primary | ICD-10-CM

## 2022-11-07 DIAGNOSIS — R35.0 FREQUENCY OF URINATION: ICD-10-CM

## 2022-11-07 DIAGNOSIS — R35.1 NOCTURIA: ICD-10-CM

## 2022-11-07 DIAGNOSIS — Z12.5 PROSTATE CANCER SCREENING: ICD-10-CM

## 2022-11-07 LAB
SL AMB  POCT GLUCOSE, UA: NORMAL
SL AMB LEUKOCYTE ESTERASE,UA: NORMAL
SL AMB POCT BILIRUBIN,UA: NORMAL
SL AMB POCT BLOOD,UA: NORMAL
SL AMB POCT CLARITY,UA: CLEAR
SL AMB POCT COLOR,UA: YELLOW
SL AMB POCT KETONES,UA: NORMAL
SL AMB POCT NITRITE,UA: NORMAL
SL AMB POCT PH,UA: 5.5
SL AMB POCT SPECIFIC GRAVITY,UA: 1.02
SL AMB POCT URINE PROTEIN: NORMAL
SL AMB POCT UROBILINOGEN: 0.2

## 2022-11-07 RX ORDER — TADALAFIL 5 MG/1
5 TABLET ORAL DAILY
Qty: 30 TABLET | Refills: 3 | Status: SHIPPED | OUTPATIENT
Start: 2022-11-07

## 2022-11-07 NOTE — PROGRESS NOTES
11/7/2022    Chief Complaint   Patient presents with   • Urinary Frequency     States that he has issues starting the urination flow, and sometimes doesn't feel that his bladder empties all the way  Assessment and Plan    67 y o  male new patient to office    1  BPH with LUTS  · Failed tamsulosin 0 4 mg twice in the past   · He does suffer from erectile dysfunction and would like to avoid the use of finasteride 5 mg daily  I am recommending a trial of tadalafil 5 mg daily for management of his BPH  He will likely require prior authorization so he was provided with a Good Rx coupon for tadalafil 5 mg daily for 30 days  · Follow-up in 3 months to reassess  2  Prostate Cancer Screening  · Negative family history of prostate cancer  · PSA 3 58 (6/22/2020)  · DIMA reveals smooth symmetric prostate, no palpable nodules or masses  · Repeat PSA in 1 week and call with results  History of Present Illness  Perlita Perez is a 67 y o  male new patient to office PMHx significant for HTN, DM, and arthritis  Here for evaluation of BPH  He reports incomplete bladder emptying ,weak urinary stream, frequency, and nocturia 3-5 times per night ongoing for about 15 years  Reports prior Urologic care with Dr Sherry Leach   He was tried on Flomax 0 4 mg daily in the past, but is not currently taking it  He reports the he was prescribed Flomax on two separate occasions and while taking it he experienced no change in his urinary symptoms  He also has erectile dysfunction and reports difficulty maintaining an erection and is prescribed sildenafil 20 mg on demand  Prostate Cancer Screening: PSA 3 58 as of 6/22/2020  Negative family history of prostate cancer  Review of Systems   Constitutional: Negative for chills and fever  HENT: Negative for congestion and sore throat  Respiratory: Negative for cough and shortness of breath  Cardiovascular: Negative for chest pain and leg swelling  Gastrointestinal: Negative for abdominal pain, constipation, diarrhea, nausea and vomiting  Genitourinary: Positive for difficulty urinating and frequency  Negative for dysuria, flank pain, hematuria and urgency  Nocturia   Musculoskeletal: Negative for back pain and gait problem  Skin: Negative for wound  Allergic/Immunologic: Negative for immunocompromised state  Neurological: Negative for dizziness, weakness and numbness  Hematological: Does not bruise/bleed easily  Vitals  Vitals:    11/07/22 0954   BP: 162/80   Pulse: 70   SpO2: 98%   Weight: 101 kg (222 lb)   Height: 5' 8" (1 727 m)       Physical Exam  Vitals reviewed  Constitutional:       General: He is not in acute distress  Appearance: Normal appearance  He is not ill-appearing or toxic-appearing  HENT:      Head: Normocephalic and atraumatic  Eyes:      General: No scleral icterus  Conjunctiva/sclera: Conjunctivae normal    Cardiovascular:      Rate and Rhythm: Normal rate  Pulmonary:      Effort: Pulmonary effort is normal  No respiratory distress  Abdominal:      Tenderness: There is no right CVA tenderness or left CVA tenderness  Hernia: No hernia is present  Genitourinary:     Comments: DIMA reveals a smooth symmetric prostate, no palpable nodules or masses  Musculoskeletal:      Cervical back: Normal range of motion  Right lower leg: No edema  Left lower leg: No edema  Skin:     General: Skin is warm and dry  Coloration: Skin is not jaundiced or pale  Neurological:      General: No focal deficit present  Mental Status: He is alert and oriented to person, place, and time  Mental status is at baseline  Gait: Gait normal    Psychiatric:         Mood and Affect: Mood normal          Behavior: Behavior normal          Thought Content:  Thought content normal          Judgment: Judgment normal          Past History  Past Medical History:   Diagnosis Date   • Arthritis • Diabetes mellitus ([facility] [address] )    • Hypertension      Social History     Socioeconomic History   • Marital status: /Civil Union     Spouse name: None   • Number of children: None   • Years of education: None   • Highest education level: None   Occupational History   • None   Tobacco Use   • Smoking status: Never Smoker   • Smokeless tobacco: Never Used   Substance and Sexual Activity   • Alcohol use: Yes   • Drug use: Not Currently   • Sexual activity: None   Other Topics Concern   • None   Social History Narrative   • None     Social Determinants of Health     Financial Resource Strain: Not on file   Food Insecurity: Not on file   Transportation Needs: Not on file   Physical Activity: Not on file   Stress: Not on file   Social Connections: Not on file   Intimate Partner Violence: Not on file   Housing Stability: Not on file     Social History     Tobacco Use   Smoking Status Never Smoker   Smokeless Tobacco Never Used     Family History   Problem Relation Age of Onset   • Dementia Mother    • No Known Problems Father        The following portions of the patient's history were reviewed and updated as appropriate allergies, current medications, past medical history, past social history, past surgical history and problem list    Imaging:    Results  No results found for this or any previous visit (from the past 1 hour(s))  ]  No results found for: PSA  No results found for: GLUCOSE, CALCIUM, NA, K, CO2, CL, BUN, CREATININE  No results found for: WBC, HGB, HCT, MCV, PLT    Please Note:  Voice dictation software has been used to create this document  There may be inadvertent transcriptions errors       Meghan Blanc

## 2022-11-14 ENCOUNTER — APPOINTMENT (OUTPATIENT)
Dept: LAB | Facility: HOSPITAL | Age: 72
End: 2022-11-14

## 2022-11-14 DIAGNOSIS — Z12.5 PROSTATE CANCER SCREENING: ICD-10-CM

## 2022-11-14 DIAGNOSIS — Z12.5 PROSTATE CANCER SCREENING: Primary | ICD-10-CM

## 2022-11-14 LAB — PSA SERPL-MCNC: 3.4 NG/ML (ref 0–4)

## 2023-02-13 ENCOUNTER — OFFICE VISIT (OUTPATIENT)
Dept: UROLOGY | Facility: CLINIC | Age: 73
End: 2023-02-13

## 2023-02-13 VITALS — BODY MASS INDEX: 33.49 KG/M2 | HEIGHT: 68 IN | WEIGHT: 221 LBS

## 2023-02-13 DIAGNOSIS — R35.1 NOCTURIA: ICD-10-CM

## 2023-02-13 DIAGNOSIS — Z12.5 PROSTATE CANCER SCREENING: ICD-10-CM

## 2023-02-13 DIAGNOSIS — R35.0 FREQUENCY OF URINATION: ICD-10-CM

## 2023-02-13 DIAGNOSIS — N52.9 ERECTILE DYSFUNCTION, UNSPECIFIED ERECTILE DYSFUNCTION TYPE: ICD-10-CM

## 2023-02-13 DIAGNOSIS — N40.0 BPH WITHOUT OBSTRUCTION/LOWER URINARY TRACT SYMPTOMS: Primary | ICD-10-CM

## 2023-02-13 RX ORDER — TADALAFIL 5 MG/1
5 TABLET ORAL DAILY
Qty: 30 TABLET | Refills: 3 | Status: SHIPPED | OUTPATIENT
Start: 2023-02-13 | End: 2023-02-14 | Stop reason: SDUPTHER

## 2023-02-13 NOTE — PROGRESS NOTES
2/13/2023    No chief complaint on file  Assessment and Plan    67 y o  male     1  BPH with LUTS  · Failed Flomax on 2 separate occasion and due to issues with erectile dysfunction is not a good candidate for finasteride  · He has had moderate improvement in symptoms since starting Cialis 5 mg daily for management of both his BPH and erectile dysfunction  I recommend continuing this indefinitely  He does not wish to pursue surgical discussion/evaluation at this time  · Follow-up 1 year or sooner as needed  2   Prostate cancer screening  · PSA is stable at 3 4 (11/14/2022)  · Negative family history prostate cancer  · DIMA was benign during last office visit  · Follow-up 1 year with PSA                Subjective:      He presents today reporting a moderate improvement in his lower urinary symptoms since starting Cialis 5 mg daily  His frequency has return to his baseline of every 2-3 hours and he feels his stream has also improved  He is satisfied with his urinary symptoms at this time and does not wish to pursue any surgical discussion/evaluation  In regards to his erectile dysfunction this has also significantly improved and he feels he is doing better now then when he was taking Cialis on demand  History of Present Illness  Renetta Dominguez is a 67 y o  male here for follow-up evaluation of BPH  He was initially seen by me on 11/7/2022 for complaints of incomplete bladder emptying, weak urinary stream, frequency, nocturia 3-5 times per night  This had been ongoing for approximately 15 years  He reported prior urologic care with Dr Rosa Arzola and had been tried on Flomax 0 4 mg daily in the past   At the time of his initial office with me he reported he was not taking anything but felt that when he had been taking Flomax this did not help his symptoms    He also complained of erectile dysfunction and reports difficulty maintaining erection and had been prescribed sildenafil 20 mg on demand  As he reported issues with both erectile dysfunction and BPH and wished to avoid finasteride he was prescribed tadalafil 5 mg daily  Prostate cancer screening: No family history of prostate cancer  Current PSA 3 4 as of 11/14/2022 previously 3 58 on 6/22/2020  Review of Systems   Constitutional: Negative for chills and fever  HENT: Negative for congestion and sore throat  Respiratory: Negative for cough and shortness of breath  Cardiovascular: Negative for chest pain and leg swelling  Gastrointestinal: Negative for abdominal pain, constipation, diarrhea, nausea and vomiting  Genitourinary: Positive for frequency and urgency  Negative for difficulty urinating, dysuria, flank pain, hematuria, penile pain and penile swelling  Musculoskeletal: Negative for back pain and gait problem  Skin: Negative for wound  Allergic/Immunologic: Negative for immunocompromised state  Neurological: Negative for dizziness, weakness and numbness  Hematological: Does not bruise/bleed easily  Vitals  Vitals:    02/13/23 1110   BP: (P) 126/74   BP Location: (P) Left arm   Patient Position: (P) Sitting   Cuff Size: (P) Standard   Pulse: (P) 55   Temp: (!) (P) 96 8 °F (36 °C)   SpO2: (P) 99%   Weight: 100 kg (221 lb)   Height: 5' 8" (1 727 m)       Physical Exam  Vitals reviewed  Constitutional:       General: He is not in acute distress  Appearance: Normal appearance  He is not ill-appearing or toxic-appearing  HENT:      Head: Normocephalic and atraumatic  Eyes:      General: No scleral icterus  Conjunctiva/sclera: Conjunctivae normal    Cardiovascular:      Rate and Rhythm: Normal rate  Pulmonary:      Effort: Pulmonary effort is normal  No respiratory distress  Abdominal:      Tenderness: There is no right CVA tenderness or left CVA tenderness  Hernia: No hernia is present  Musculoskeletal:      Cervical back: Normal range of motion        Right lower leg: No edema  Left lower leg: No edema  Skin:     General: Skin is warm and dry  Coloration: Skin is not jaundiced or pale  Neurological:      General: No focal deficit present  Mental Status: He is alert and oriented to person, place, and time  Mental status is at baseline  Gait: Gait normal    Psychiatric:         Mood and Affect: Mood normal          Behavior: Behavior normal          Thought Content: Thought content normal          Judgment: Judgment normal          Past History  Past Medical History:   Diagnosis Date   • Arthritis    • Diabetes mellitus ([facility] [address] )    • Hypertension      Social History     Socioeconomic History   • Marital status: /Civil Union     Spouse name: None   • Number of children: None   • Years of education: None   • Highest education level: None   Occupational History   • None   Tobacco Use   • Smoking status: Never   • Smokeless tobacco: Never   Substance and Sexual Activity   • Alcohol use:  Yes   • Drug use: Not Currently   • Sexual activity: None   Other Topics Concern   • None   Social History Narrative   • None     Social Determinants of Health     Financial Resource Strain: Not on file   Food Insecurity: Not on file   Transportation Needs: Not on file   Physical Activity: Not on file   Stress: Not on file   Social Connections: Not on file   Intimate Partner Violence: Not on file   Housing Stability: Not on file     Social History     Tobacco Use   Smoking Status Never   Smokeless Tobacco Never     Family History   Problem Relation Age of Onset   • Dementia Mother    • No Known Problems Father        The following portions of the patient's history were reviewed and updated as appropriate allergies, current medications, past medical history, past social history, past surgical history and problem list    Imaging:    Results  No results found for this or any previous visit (from the past 1 hour(s)) ]  Lab Results   Component Value Date    PSA 3 4 11/14/2022 No results found for: GLUCOSE, CALCIUM, NA, K, CO2, CL, BUN, CREATININE  No results found for: WBC, HGB, HCT, MCV, PLT    Please Note:  Voice dictation software has been used to create this document  There may be inadvertent transcriptions errors       DESTIN Ceballos 02/13/23

## 2023-02-13 NOTE — Clinical Note
FYI patient was trialed on Cialis 5 mg daily for BPH and ED with use of good RX coupon  Will need prior authorization to continue use

## 2023-02-14 DIAGNOSIS — N40.0 BPH WITHOUT OBSTRUCTION/LOWER URINARY TRACT SYMPTOMS: ICD-10-CM

## 2023-02-14 DIAGNOSIS — R35.1 NOCTURIA: ICD-10-CM

## 2023-02-14 DIAGNOSIS — R35.0 FREQUENCY OF URINATION: ICD-10-CM

## 2023-02-14 RX ORDER — TADALAFIL 5 MG/1
5 TABLET ORAL DAILY
Qty: 90 TABLET | Refills: 3 | Status: SHIPPED | OUTPATIENT
Start: 2023-02-14

## 2023-02-14 NOTE — TELEPHONE ENCOUNTER
I reached out to the patient  We discussed BPH criteria patient needs to have satisfied prior to submitting a prior authorization  Unfortunately, the patient does not qualify as there is no historical alpha blockers noted in his chart  Tadalafil being used for BPH would be denied because step-therapy hasn't been met  Again, we discussed cost-effective pricing and various pharmacy vendors  Dorian recommended for best cash pay pricing  Cost for a 90 day supply of Tadalafil 5mg at [address] [place] is only $19 15  Subsequent refills are likely to be a bit more ($26 65) but the patient chose to change his pharmacy to [facility] instead of [facility] as this is the pharmacy closest to his home  GoodRx coupon was included on the prescription       Script was requeued and forwarded to the Advanced Practitioner covering the [facility] for approval

## 2023-02-14 NOTE — TELEPHONE ENCOUNTER
----- Message from Marin STODDARD  79  sent at 2/13/2023 11:26 AM EST -----  FYI patient was trialed on Cialis 5 mg daily for BPH and ED with use of good RX coupon  Will need prior authorization to continue use

## 2023-06-16 ENCOUNTER — DOCTOR'S OFFICE (OUTPATIENT)
Dept: URBAN - NONMETROPOLITAN AREA CLINIC 1 | Facility: CLINIC | Age: 73
Setting detail: OPHTHALMOLOGY
End: 2023-06-16
Payer: COMMERCIAL

## 2023-06-16 DIAGNOSIS — H25.13: ICD-10-CM

## 2023-06-16 DIAGNOSIS — H35.373: ICD-10-CM

## 2023-06-16 DIAGNOSIS — E11.9: ICD-10-CM

## 2023-06-16 DIAGNOSIS — H49.12: ICD-10-CM

## 2023-06-16 DIAGNOSIS — H25.013: ICD-10-CM

## 2023-06-16 PROCEDURE — 92134 CPTRZ OPH DX IMG PST SGM RTA: CPT | Performed by: OPHTHALMOLOGY

## 2023-06-16 PROCEDURE — 99214 OFFICE O/P EST MOD 30 MIN: CPT | Performed by: OPHTHALMOLOGY

## 2023-06-16 ASSESSMENT — REFRACTION_CURRENTRX
OD_HPRISM: 0.5
OS_CYLINDER: -0.50
OS_AXIS: 077
OD_ADD: +2.50
OD_SPHERE: -0.50
OS_SPHERE: -0.25
OS_VPRISM: BD
OD_CYLINDER: -1.25
OD_OVR_VA: 20/
OS_OVR_VA: 20/
OD_HPRISM_DIRECTION: BO
OD_AXIS: 073
OD_VPRISM: BU
OD_VPRISM_DIRECTION: PROGS
OS_VPRISM_DIRECTION: PROGS
OS_ADD: +2.50

## 2023-06-16 ASSESSMENT — KERATOMETRY
OS_K2POWER_DIOPTERS: 42.50
OD_K2POWER_DIOPTERS: 43.50
OS_K1POWER_DIOPTERS: 42.50
OD_K1POWER_DIOPTERS: 42.75
OD_AXISANGLE_DEGREES: 139

## 2023-06-16 ASSESSMENT — AXIALLENGTH_DERIVED
OS_AL: 24.1677
OD_AL: 24.2851
OD_AL: 24.3888
OS_AL: 24.219

## 2023-06-16 ASSESSMENT — REFRACTION_MANIFEST
OD_CYLINDER: -1.25
OD_VA1: 20/30+2
OD_ADD: +2.50
OD_VPRISM: BU
OS_VA1: 20/25-2
OS_VPRISM: BD
OS_ADD: +2.50
OD_AXIS: 075
OS_CYLINDER: -0.25
OS_SPHERE: -0.50
OS_AXIS: 010
OD_SPHERE: -1.00

## 2023-06-16 ASSESSMENT — REFRACTION_AUTOREFRACTION
OS_CYLINDER: 0.00
OS_SPHERE: -0.50
OD_CYLINDER: -0.75
OD_SPHERE: -1.00
OD_AXIS: 080

## 2023-06-16 ASSESSMENT — LID EXAM ASSESSMENTS
OD_MEIBOMITIS: RUL 1+
OS_COMMENTS: OS UL COMMENTS
OD_COMMENTS: OS UL COMMENTS
OD_BLEPHARITIS: RUL T
OS_BLEPHARITIS: LUL T
OS_MEIBOMITIS: LUL 1+

## 2023-06-16 ASSESSMENT — SPHEQUIV_DERIVED
OS_SPHEQUIV: -0.625
OD_SPHEQUIV: -1.375
OS_SPHEQUIV: -0.5
OD_SPHEQUIV: -1.625

## 2023-06-16 ASSESSMENT — LID POSITION - DERMATOCHALASIS
OS_DERMATOCHALASIS: LUL 1+
OD_DERMATOCHALASIS: RUL 1+

## 2023-06-16 ASSESSMENT — VISUAL ACUITY
OS_BCVA: 20/30-1
OD_BCVA: 20/25

## 2023-06-16 ASSESSMENT — CONFRONTATIONAL VISUAL FIELD TEST (CVF)
OS_FINDINGS: FULL
OD_FINDINGS: FULL

## 2023-06-26 ENCOUNTER — DOCTOR'S OFFICE (OUTPATIENT)
Dept: URBAN - NONMETROPOLITAN AREA CLINIC 1 | Facility: CLINIC | Age: 73
Setting detail: OPHTHALMOLOGY
End: 2023-06-26
Payer: COMMERCIAL

## 2023-06-26 DIAGNOSIS — E11.9: ICD-10-CM

## 2023-06-26 DIAGNOSIS — H53.2: ICD-10-CM

## 2023-06-26 DIAGNOSIS — H50.10: ICD-10-CM

## 2023-06-26 DIAGNOSIS — H25.013: ICD-10-CM

## 2023-06-26 DIAGNOSIS — H25.13: ICD-10-CM

## 2023-06-26 DIAGNOSIS — H49.12: ICD-10-CM

## 2023-06-26 PROCEDURE — 99213 OFFICE O/P EST LOW 20 MIN: CPT | Performed by: OPHTHALMOLOGY

## 2023-06-26 ASSESSMENT — AXIALLENGTH_DERIVED
OD_AL: 24.1824
OS_AL: 24.219
OD_AL: 24.3888
OS_AL: 24.219

## 2023-06-26 ASSESSMENT — REFRACTION_CURRENTRX
OS_AXIS: 072
OS_VPRISM_DIRECTION: PROGS
OS_SPHERE: -1.25
OS_VPRISM: BD
OD_VPRISM_DIRECTION: PROGS
OD_SPHERE: -0.75
OD_VPRISM: BU
OD_OVR_VA: 20/
OD_ADD: +2.50
OD_CYLINDER: -0.25
OS_OVR_VA: 20/
OS_CYLINDER: -1.00
OD_AXIS: 176
OS_ADD: +2.50

## 2023-06-26 ASSESSMENT — SPHEQUIV_DERIVED
OD_SPHEQUIV: -1.125
OD_SPHEQUIV: -1.625
OS_SPHEQUIV: -0.625
OS_SPHEQUIV: -0.625

## 2023-06-26 ASSESSMENT — REFRACTION_AUTOREFRACTION
OD_AXIS: 60
OD_SPHERE: -0.75
OS_AXIS: 88
OS_SPHERE: -0.50
OD_CYLINDER: -0.75
OS_CYLINDER: -0.25

## 2023-06-26 ASSESSMENT — REFRACTION_MANIFEST
OS_VPRISM: BD
OD_ADD: +2.50
OD_AXIS: 075
OS_VA1: 20/25-2
OD_SPHERE: -1.00
OS_SPHERE: -0.50
OS_CYLINDER: -0.25
OD_VA1: 20/30+2
OD_CYLINDER: -1.25
OS_ADD: +2.50
OS_AXIS: 010
OD_VPRISM: BU

## 2023-06-26 ASSESSMENT — VISUAL ACUITY
OD_BCVA: 20/25-2
OS_BCVA: 20/25-1

## 2023-06-26 ASSESSMENT — CONFRONTATIONAL VISUAL FIELD TEST (CVF)
OS_FINDINGS: FULL
OD_FINDINGS: FULL

## 2023-06-26 ASSESSMENT — LID EXAM ASSESSMENTS
OS_MEIBOMITIS: LUL 1+
OS_COMMENTS: OS UL COMMENTS
OD_MEIBOMITIS: RUL 1+
OD_BLEPHARITIS: RUL T
OS_BLEPHARITIS: LUL T
OD_COMMENTS: OS UL COMMENTS

## 2023-06-26 ASSESSMENT — LID POSITION - DERMATOCHALASIS
OD_DERMATOCHALASIS: RUL 1+
OS_DERMATOCHALASIS: LUL 1+

## 2023-06-26 ASSESSMENT — KERATOMETRY
OD_AXISANGLE_DEGREES: 139
OD_K1POWER_DIOPTERS: 42.75
OD_K2POWER_DIOPTERS: 43.50
OS_K2POWER_DIOPTERS: 42.50
OS_K1POWER_DIOPTERS: 42.50

## 2023-11-16 ENCOUNTER — OFFICE VISIT (OUTPATIENT)
Dept: URGENT CARE | Facility: CLINIC | Age: 73
End: 2023-11-16
Payer: MEDICARE

## 2023-11-16 ENCOUNTER — HOSPITAL ENCOUNTER (INPATIENT)
Facility: HOSPITAL | Age: 73
LOS: 2 days | Discharge: HOME | DRG: 305 | End: 2023-11-18
Attending: STUDENT IN AN ORGANIZED HEALTH CARE EDUCATION/TRAINING PROGRAM | Admitting: FAMILY MEDICINE
Payer: MEDICARE

## 2023-11-16 ENCOUNTER — APPOINTMENT (EMERGENCY)
Dept: CT IMAGING | Facility: HOSPITAL | Age: 73
DRG: 305 | End: 2023-11-16
Payer: MEDICARE

## 2023-11-16 VITALS
HEIGHT: 68 IN | WEIGHT: 212 LBS | TEMPERATURE: 96.8 F | RESPIRATION RATE: 20 BRPM | OXYGEN SATURATION: 100 % | HEART RATE: 60 BPM | DIASTOLIC BLOOD PRESSURE: 80 MMHG | SYSTOLIC BLOOD PRESSURE: 200 MMHG | BODY MASS INDEX: 32.13 KG/M2

## 2023-11-16 DIAGNOSIS — R42 DIZZINESS: ICD-10-CM

## 2023-11-16 DIAGNOSIS — R42 DIZZINESS: Primary | ICD-10-CM

## 2023-11-16 DIAGNOSIS — I16.0 HYPERTENSIVE URGENCY: ICD-10-CM

## 2023-11-16 DIAGNOSIS — I16.0 HYPERTENSIVE URGENCY: Primary | ICD-10-CM

## 2023-11-16 PROBLEM — Z79.4 TYPE 2 DIABETES MELLITUS WITHOUT COMPLICATION, WITH LONG-TERM CURRENT USE OF INSULIN (HCC): Status: ACTIVE | Noted: 2023-11-16

## 2023-11-16 PROBLEM — E11.9 TYPE 2 DIABETES MELLITUS WITHOUT COMPLICATION, WITH LONG-TERM CURRENT USE OF INSULIN (HCC): Status: ACTIVE | Noted: 2023-11-16

## 2023-11-16 PROBLEM — N40.0 BENIGN PROSTATIC HYPERPLASIA WITHOUT LOWER URINARY TRACT SYMPTOMS: Status: ACTIVE | Noted: 2023-11-16

## 2023-11-16 LAB
ANION GAP SERPL CALCULATED.3IONS-SCNC: 9 MMOL/L
ATRIAL RATE: 51 BPM
BASOPHILS # BLD AUTO: 0.04 THOUSANDS/ÂΜL (ref 0–0.1)
BASOPHILS NFR BLD AUTO: 0 % (ref 0–1)
BUN SERPL-MCNC: 23 MG/DL (ref 5–25)
CALCIUM SERPL-MCNC: 10 MG/DL (ref 8.4–10.2)
CARDIAC TROPONIN I PNL SERPL HS: 14 NG/L (ref 8–18)
CARDIAC TROPONIN I PNL SERPL HS: 17 NG/L (ref 8–18)
CHLORIDE SERPL-SCNC: 101 MMOL/L (ref 96–108)
CO2 SERPL-SCNC: 28 MMOL/L (ref 21–32)
CREAT SERPL-MCNC: 1.15 MG/DL (ref 0.6–1.3)
EOSINOPHIL # BLD AUTO: 0.07 THOUSAND/ÂΜL (ref 0–0.61)
EOSINOPHIL NFR BLD AUTO: 1 % (ref 0–6)
ERYTHROCYTE [DISTWIDTH] IN BLOOD BY AUTOMATED COUNT: 15 % (ref 11.6–15.1)
GFR SERPL CREATININE-BSD FRML MDRD: 62 ML/MIN/1.73SQ M
GLUCOSE SERPL-MCNC: 152 MG/DL (ref 65–140)
GLUCOSE SERPL-MCNC: 186 MG/DL (ref 65–140)
GLUCOSE SERPL-MCNC: 227 MG/DL (ref 65–140)
HCT VFR BLD AUTO: 42.4 % (ref 36.5–49.3)
HGB BLD-MCNC: 13.6 G/DL (ref 12–17)
IMM GRANULOCYTES # BLD AUTO: 0.03 THOUSAND/UL (ref 0–0.2)
IMM GRANULOCYTES NFR BLD AUTO: 0 % (ref 0–2)
LYMPHOCYTES # BLD AUTO: 1.57 THOUSANDS/ÂΜL (ref 0.6–4.47)
LYMPHOCYTES NFR BLD AUTO: 17 % (ref 14–44)
MAGNESIUM SERPL-MCNC: 1.6 MG/DL (ref 1.9–2.7)
MCH RBC QN AUTO: 27.1 PG (ref 26.8–34.3)
MCHC RBC AUTO-ENTMCNC: 32.1 G/DL (ref 31.4–37.4)
MCV RBC AUTO: 85 FL (ref 82–98)
MONOCYTES # BLD AUTO: 0.59 THOUSAND/ÂΜL (ref 0.17–1.22)
MONOCYTES NFR BLD AUTO: 7 % (ref 4–12)
NEUTROPHILS # BLD AUTO: 6.77 THOUSANDS/ÂΜL (ref 1.85–7.62)
NEUTS SEG NFR BLD AUTO: 75 % (ref 43–75)
NRBC BLD AUTO-RTO: 0 /100 WBCS
P AXIS: 62 DEGREES
PLATELET # BLD AUTO: 189 THOUSANDS/UL (ref 149–390)
PMV BLD AUTO: 11.5 FL (ref 8.9–12.7)
POTASSIUM SERPL-SCNC: 4.1 MMOL/L (ref 3.5–5.3)
PR INTERVAL: 188 MS
QRS AXIS: 45 DEGREES
QRSD INTERVAL: 88 MS
QT INTERVAL: 428 MS
QTC INTERVAL: 394 MS
RBC # BLD AUTO: 5.02 MILLION/UL (ref 3.88–5.62)
SODIUM SERPL-SCNC: 138 MMOL/L (ref 135–147)
T WAVE AXIS: 138 DEGREES
VENTRICULAR RATE: 51 BPM
WBC # BLD AUTO: 9.07 THOUSAND/UL (ref 4.31–10.16)

## 2023-11-16 PROCEDURE — 99285 EMERGENCY DEPT VISIT HI MDM: CPT

## 2023-11-16 PROCEDURE — 96375 TX/PRO/DX INJ NEW DRUG ADDON: CPT

## 2023-11-16 PROCEDURE — 93010 ELECTROCARDIOGRAM REPORT: CPT | Performed by: PHYSICIAN ASSISTANT

## 2023-11-16 PROCEDURE — 83735 ASSAY OF MAGNESIUM: CPT | Performed by: STUDENT IN AN ORGANIZED HEALTH CARE EDUCATION/TRAINING PROGRAM

## 2023-11-16 PROCEDURE — 36415 COLL VENOUS BLD VENIPUNCTURE: CPT | Performed by: STUDENT IN AN ORGANIZED HEALTH CARE EDUCATION/TRAINING PROGRAM

## 2023-11-16 PROCEDURE — 93005 ELECTROCARDIOGRAM TRACING: CPT | Performed by: PHYSICIAN ASSISTANT

## 2023-11-16 PROCEDURE — G0463 HOSPITAL OUTPT CLINIC VISIT: HCPCS | Performed by: PHYSICIAN ASSISTANT

## 2023-11-16 PROCEDURE — 99213 OFFICE O/P EST LOW 20 MIN: CPT | Performed by: PHYSICIAN ASSISTANT

## 2023-11-16 PROCEDURE — 99223 1ST HOSP IP/OBS HIGH 75: CPT | Performed by: FAMILY MEDICINE

## 2023-11-16 PROCEDURE — 85025 COMPLETE CBC W/AUTO DIFF WBC: CPT | Performed by: STUDENT IN AN ORGANIZED HEALTH CARE EDUCATION/TRAINING PROGRAM

## 2023-11-16 PROCEDURE — 80048 BASIC METABOLIC PNL TOTAL CA: CPT | Performed by: STUDENT IN AN ORGANIZED HEALTH CARE EDUCATION/TRAINING PROGRAM

## 2023-11-16 PROCEDURE — 84484 ASSAY OF TROPONIN QUANT: CPT | Performed by: STUDENT IN AN ORGANIZED HEALTH CARE EDUCATION/TRAINING PROGRAM

## 2023-11-16 PROCEDURE — 70450 CT HEAD/BRAIN W/O DYE: CPT

## 2023-11-16 PROCEDURE — 82948 REAGENT STRIP/BLOOD GLUCOSE: CPT

## 2023-11-16 PROCEDURE — G1004 CDSM NDSC: HCPCS

## 2023-11-16 PROCEDURE — 93005 ELECTROCARDIOGRAM TRACING: CPT

## 2023-11-16 PROCEDURE — 96365 THER/PROPH/DIAG IV INF INIT: CPT

## 2023-11-16 RX ORDER — INSULIN LISPRO 100 [IU]/ML
1-6 INJECTION, SOLUTION INTRAVENOUS; SUBCUTANEOUS
Status: DISCONTINUED | OUTPATIENT
Start: 2023-11-16 | End: 2023-11-18 | Stop reason: HOSPADM

## 2023-11-16 RX ORDER — TADALAFIL 5 MG/1
5 TABLET ORAL DAILY
Status: DISCONTINUED | OUTPATIENT
Start: 2023-11-17 | End: 2023-11-18 | Stop reason: HOSPADM

## 2023-11-16 RX ORDER — PANTOPRAZOLE SODIUM 40 MG/1
40 TABLET, DELAYED RELEASE ORAL
Status: DISCONTINUED | OUTPATIENT
Start: 2023-11-17 | End: 2023-11-18 | Stop reason: HOSPADM

## 2023-11-16 RX ORDER — LABETALOL HYDROCHLORIDE 5 MG/ML
20 INJECTION, SOLUTION INTRAVENOUS ONCE
Status: COMPLETED | OUTPATIENT
Start: 2023-11-16 | End: 2023-11-16

## 2023-11-16 RX ORDER — ENOXAPARIN SODIUM 100 MG/ML
40 INJECTION SUBCUTANEOUS DAILY
Status: DISCONTINUED | OUTPATIENT
Start: 2023-11-17 | End: 2023-11-18 | Stop reason: HOSPADM

## 2023-11-16 RX ORDER — HYDROCHLOROTHIAZIDE 25 MG/1
25 TABLET ORAL DAILY
Status: DISCONTINUED | OUTPATIENT
Start: 2023-11-17 | End: 2023-11-18 | Stop reason: HOSPADM

## 2023-11-16 RX ORDER — MAGNESIUM SULFATE HEPTAHYDRATE 40 MG/ML
2 INJECTION, SOLUTION INTRAVENOUS ONCE
Status: COMPLETED | OUTPATIENT
Start: 2023-11-16 | End: 2023-11-16

## 2023-11-16 RX ORDER — INSULIN GLARGINE 100 [IU]/ML
10 INJECTION, SOLUTION SUBCUTANEOUS EVERY 12 HOURS SCHEDULED
Status: DISCONTINUED | OUTPATIENT
Start: 2023-11-16 | End: 2023-11-17

## 2023-11-16 RX ORDER — LOSARTAN POTASSIUM 50 MG/1
100 TABLET ORAL DAILY
Status: DISCONTINUED | OUTPATIENT
Start: 2023-11-17 | End: 2023-11-18 | Stop reason: HOSPADM

## 2023-11-16 RX ORDER — ACETAMINOPHEN 325 MG/1
650 TABLET ORAL EVERY 6 HOURS PRN
Status: DISCONTINUED | OUTPATIENT
Start: 2023-11-16 | End: 2023-11-18 | Stop reason: HOSPADM

## 2023-11-16 RX ORDER — ATORVASTATIN CALCIUM 40 MG/1
40 TABLET, FILM COATED ORAL
Status: DISCONTINUED | OUTPATIENT
Start: 2023-11-16 | End: 2023-11-18 | Stop reason: HOSPADM

## 2023-11-16 RX ORDER — HYDRALAZINE HYDROCHLORIDE 20 MG/ML
5 INJECTION INTRAMUSCULAR; INTRAVENOUS EVERY 6 HOURS PRN
Status: DISCONTINUED | OUTPATIENT
Start: 2023-11-16 | End: 2023-11-18 | Stop reason: HOSPADM

## 2023-11-16 RX ADMIN — INSULIN LISPRO 1 UNITS: 100 INJECTION, SOLUTION INTRAVENOUS; SUBCUTANEOUS at 19:46

## 2023-11-16 RX ADMIN — MAGNESIUM SULFATE HEPTAHYDRATE 2 G: 40 INJECTION, SOLUTION INTRAVENOUS at 16:06

## 2023-11-16 RX ADMIN — Medication 20 MG: at 14:56

## 2023-11-16 RX ADMIN — ATORVASTATIN CALCIUM 40 MG: 40 TABLET, FILM COATED ORAL at 19:46

## 2023-11-16 RX ADMIN — SODIUM CHLORIDE 1000 ML: 0.9 INJECTION, SOLUTION INTRAVENOUS at 16:05

## 2023-11-16 RX ADMIN — INSULIN GLARGINE 10 UNITS: 100 INJECTION, SOLUTION SUBCUTANEOUS at 20:37

## 2023-11-16 NOTE — ED NOTES
Patient's monitor alarming for bradycardia, pts heart rate in the low forties. Provider made aware.       Ike Kennedy RN  11/16/23 [id number]

## 2023-11-16 NOTE — ASSESSMENT & PLAN NOTE
Lab Results   Component Value Date    HGBA1C 7.3 (H) 10/24/2023       No results for input(s): "POCGLU" in the last 72 hours.     Blood Sugar Average: Last 72 hrs:  Home Lantus 15 units twice daily, and Humalog sliding scale 3 times daily with meals  Will decrease lantus to 10 units BID while inpatient   Hypoglycemia protocol

## 2023-11-16 NOTE — ED PROVIDER NOTES
History  Chief Complaint   Patient presents with    Dizziness    Hypertension     Patient presents to the ED with complaints of dizziness. The patient was evaluated at Urgent care and directed to come to ED for furthert eval secondary to hypertension. Patient hypertensive upon arrival and still reports feeling lightheadedness. History provided by:  Medical records and patient  Medical Problem  Severity:  Mild  Onset quality:  Gradual  Duration:  12 hours  Timing:  Intermittent  Progression:  Partially resolved  Chronicity:  New  Context:  Hx of HTN. Has been compliant with all medications. States he developed vertigo-like symptoms this AM. Felt unbalanced when ambulating/head turning. Improved with rest. Asymptomatic at this time. Evaluated at --hypertensive urgency. Denies chest pain/sob. Relieved by:  Nothing tried  Worsened by:  Ambulating/head turning  Ineffective treatments:  Nothing tried  Associated symptoms: no abdominal pain, no chest pain, no congestion, no cough, no diarrhea, no fatigue, no fever, no headaches, no myalgias, no nausea, no rash, no rhinorrhea, no shortness of breath, no sore throat, no vomiting and no wheezing      Prior to Admission Medications   Prescriptions Last Dose Informant Patient Reported? Taking?    Candesartan Cilexetil-HCTZ 32-25 MG TABS 2023  Yes Yes   Sig: Take 1 tablet by mouth in the morning   LANTUS SOLOSTAR 100 units/mL injection pen 2023  Yes Yes   Sig: 15 Units 2 (two) times a day   NOVOLOG FLEXPEN 100 units/mL injection pen 2023  Yes Yes   Sig: Inject under the skin 4 (four) times a day Sliding scale with meals and at bedtime   metFORMIN (GLUCOPHAGE) 1000 MG tablet 2023  Yes Yes   Si,000 mg 2 (two) times a day with meals   pantoprazole (PROTONIX) 40 mg tablet 2023  Yes Yes   Sig: Take 40 mg by mouth daily   rosuvastatin (CRESTOR) 20 MG tablet 11/15/2023  Yes Yes   Sig: Take 20 mg by mouth daily   tadalafil (CIALIS) 5 MG tablet 11/16/2023  No Yes   Sig: Take 1 tablet (5 mg total) by mouth daily   Patient taking differently: Take 5 mg by mouth if needed for erectile dysfunction      Facility-Administered Medications: None       Past Medical History:   Diagnosis Date    Arthritis     Diabetes mellitus ([address])     Hypertension        Past Surgical History:   Procedure Laterality Date    EYE SURGERY      KNEE ARTHROSCOPY         Family History   Problem Relation Age of Onset    Dementia Mother     No Known Problems Father      I have reviewed and agree with the history as documented. E-Cigarette/Vaping    E-Cigarette Use Never User      E-Cigarette/Vaping Substances     Social History     Tobacco Use    Smoking status: Never    Smokeless tobacco: Never   Vaping Use    Vaping Use: Never used   Substance Use Topics    Alcohol use: Not Currently    Drug use: Not Currently       Review of Systems   Constitutional:  Negative for activity change, appetite change, chills, diaphoresis, fatigue and fever. HENT:  Negative for congestion, rhinorrhea, sinus pressure, sinus pain and sore throat. Eyes:  Negative for pain, discharge, redness, itching and visual disturbance. Respiratory:  Negative for cough, chest tightness, shortness of breath and wheezing. Cardiovascular:  Negative for chest pain, palpitations and leg swelling. Gastrointestinal:  Negative for abdominal distention, abdominal pain, constipation, diarrhea, nausea and vomiting. Genitourinary:  Negative for decreased urine volume, difficulty urinating, dysuria, flank pain, frequency and urgency. Musculoskeletal:  Negative for arthralgias, back pain, myalgias, neck pain and neck stiffness. Skin:  Negative for color change, pallor, rash and wound. Neurological:  Positive for dizziness and light-headedness. Negative for syncope, facial asymmetry, speech difficulty, weakness, numbness and headaches. Hematological:  Does not bruise/bleed easily.    All other systems reviewed and are negative. Physical Exam  Physical Exam  Vitals and nursing note reviewed. Constitutional:       General: He is not in acute distress. Appearance: He is not ill-appearing or toxic-appearing. HENT:      Head: Normocephalic and atraumatic. Right Ear: External ear normal.      Left Ear: External ear normal.      Nose: No congestion or rhinorrhea. Mouth/Throat:      Mouth: Mucous membranes are moist.      Pharynx: Oropharynx is clear. No oropharyngeal exudate or posterior oropharyngeal erythema. Eyes:      General:         Right eye: No discharge. Left eye: No discharge. Extraocular Movements: Extraocular movements intact. Conjunctiva/sclera: Conjunctivae normal.   Cardiovascular:      Rate and Rhythm: Normal rate and regular rhythm. Pulses: Normal pulses. Heart sounds: Normal heart sounds. No murmur heard. Pulmonary:      Effort: Pulmonary effort is normal. No respiratory distress. Breath sounds: Normal breath sounds. No stridor. No wheezing, rhonchi or rales. Chest:      Chest wall: No tenderness. Abdominal:      General: Abdomen is flat. Bowel sounds are normal. There is no distension. Palpations: Abdomen is soft. There is no mass. Tenderness: There is no abdominal tenderness. There is no right CVA tenderness, left CVA tenderness, guarding or rebound. Hernia: No hernia is present. Musculoskeletal:         General: No swelling, tenderness, deformity or signs of injury. Cervical back: Neck supple. No tenderness. Right lower leg: No edema. Left lower leg: No edema. Skin:     General: Skin is warm and dry. Capillary Refill: Capillary refill takes less than 2 seconds. Coloration: Skin is not jaundiced or pale. Findings: No bruising, erythema or rash. Neurological:      General: No focal deficit present. Mental Status: He is alert and oriented to person, place, and time.       Cranial Nerves: No cranial nerve deficit. Sensory: No sensory deficit. Motor: No weakness. Psychiatric:         Mood and Affect: Mood normal.         Behavior: Behavior normal.         Thought Content:  Thought content normal.         Judgment: Judgment normal.         Vital Signs  ED Triage Vitals [11/16/23 1431]   Temperature Pulse Respirations Blood Pressure SpO2   97.9 °F (36.6 °C) 68 18 (!) 220/98 100 %      Temp Source Heart Rate Source Patient Position - Orthostatic VS BP Location FiO2 (%)   Temporal Monitor Sitting Right arm --      Pain Score       No Pain           Vitals:    11/17/23 0249 11/17/23 0501 11/17/23 0729 11/17/23 0800   BP: 154/74 166/75 (!) 162/106 160/100   Pulse: 56 59 58 62   Patient Position - Orthostatic VS:             Visual Acuity  Visual Acuity      Flowsheet Row Most Recent Value   L Pupil Size (mm) 3   R Pupil Size (mm) 3            ED Medications  Medications   labetalol (NORMODYNE) injection 20 mg (20 mg Intravenous Given 11/16/23 1456)   sodium chloride 0.9 % bolus 1,000 mL (0 mL Intravenous Stopped 11/16/23 1723)   magnesium sulfate 2 g/50 mL IVPB (premix) 2 g (0 g Intravenous Stopped 11/16/23 1723)       Diagnostic Studies  Results Reviewed       Magnesium [[id number]]  (Normal) Collected: 11/17/23 0458    Lab Status: Final result Specimen: Blood from Hand, Left Updated: 11/17/23 0525     Magnesium 2.1 mg/dL     CBC (With Platelets) [[id number]]  (Abnormal) Collected: 11/17/23 0458    Lab Status: Final result Specimen: Blood from Hand, Left Updated: 11/17/23 0518     WBC 8.14 Thousand/uL      RBC 5.01 Million/uL      Hemoglobin 13.5 g/dL      Hematocrit 42.0 %      MCV 84 fL      MCH 26.9 pg      MCHC 32.1 g/dL      RDW 15.2 %      Platelets 259 Thousands/uL      MPV 11.4 fL     High Sensitivity Troponin I Random [[id number]]  (Normal) Collected: 11/16/23 1610    Lab Status: Final result Specimen: Blood from Arm, Right Updated: 11/16/23 1636     HS TnI random 14 ng/L     High Sensitivity Troponin I Random [[id number]]  (Normal) Collected: 11/16/23 1455    Lab Status: Final result Specimen: Blood from Arm, Right Updated: 11/16/23 1527     HS TnI random 17 ng/L     Basic metabolic panel [[id number]]  (Abnormal) Collected: 11/16/23 1455    Lab Status: Final result Specimen: Blood from Arm, Right Updated: 11/16/23 1519     Sodium 138 mmol/L      Potassium 4.1 mmol/L      Chloride 101 mmol/L      CO2 28 mmol/L      ANION GAP 9 mmol/L      BUN 23 mg/dL      Creatinine 1.15 mg/dL      Glucose 186 mg/dL      Calcium 10.0 mg/dL      eGFR 62 ml/min/1.73sq m     Narrative:      [facility] for [facility] Kidney Disease (CKD):     Stage 1 with normal or high GFR (GFR > 90 mL/min/1.73 square meters)    Stage 2 Mild CKD (GFR = 60-89 mL/min/1.73 square meters)    Stage 3A Moderate CKD (GFR = 45-59 mL/min/1.73 square meters)    Stage 3B Moderate CKD (GFR = 30-44 mL/min/1.73 square meters)    Stage 4 Severe CKD (GFR = 15-29 mL/min/1.73 square meters)    Stage 5 End Stage CKD (GFR <15 mL/min/1.73 square meters)  Note: GFR calculation is accurate only with a steady state creatinine    Magnesium [[id number]]  (Abnormal) Collected: 11/16/23 1455    Lab Status: Final result Specimen: Blood from Arm, Right Updated: 11/16/23 1519     Magnesium 1.6 mg/dL     CBC and differential [[id number]] Collected: 11/16/23 1455    Lab Status: Final result Specimen: Blood from Arm, Right Updated: 11/16/23 1503     WBC 9.07 Thousand/uL      RBC 5.02 Million/uL      Hemoglobin 13.6 g/dL      Hematocrit 42.4 %      MCV 85 fL      MCH 27.1 pg      MCHC 32.1 g/dL      RDW 15.0 %      MPV 11.5 fL      Platelets 305 Thousands/uL      nRBC 0 /100 WBCs      Neutrophils Relative 75 %      Immat GRANS % 0 %      Lymphocytes Relative 17 %      Monocytes Relative 7 %      Eosinophils Relative 1 %      Basophils Relative 0 %      Neutrophils Absolute 6.77 Thousands/µL      Immature Grans Absolute 0.03 Thousand/uL Lymphocytes Absolute 1.57 Thousands/µL      Monocytes Absolute 0.59 Thousand/µL      Eosinophils Absolute 0.07 Thousand/µL      Basophils Absolute 0.04 Thousands/µL                    CT head without contrast   Final Result by Dano Tran MD (11/16 1626)      No acute intracranial abnormality. Chronic microangiopathic changes. Workstation performed: [id number]                    Procedures  CriticalCare Time    Date/Time: 11/16/2023 6:01 PM    Performed by: Erik Patterson DO  Authorized by: Erik Patterson DO    Critical care provider statement:     Critical care time (minutes):  45    Critical care was necessary to treat or prevent imminent or life-threatening deterioration of the following conditions:  Cardiac failure, CNS failure or compromise and circulatory failure    Critical care was time spent personally by me on the following activities:  Blood draw for specimens, obtaining history from patient or surrogate, development of treatment plan with patient or surrogate, discussions with consultants, evaluation of patient's response to treatment, examination of patient, review of old charts, re-evaluation of patient's condition, ordering and review of radiographic studies, ordering and review of laboratory studies and ordering and performing treatments and interventions           ED Course  ED Course as of 11/17/23 1034   Thu Nov 16, 2023   1505 No leukocytosis. Hemoglobin is within normal limits. Normal platelet count. 1538 Late entry. The patient expressed that he was feeling lightheaded. The patient is s/p IV Labetalol 20 mg x 1 does. HR 40s. BP improving--160/78. Will administer a bolus of IVF. 1552 No significant normalities noted on BMP. Troponin 17. ECG interpretation by me. Status post IV labetalol. Sinus bradycardia. Heart rate 44 bpm.  T wave inversions in the inferolateral leads. Questionable biphasic T wave in V2.  Unchanged from ECG obtained at urgent care earlier in the day. 1612 HR improving. 53 bpm. /60.    1630 No acute findings noted on CT imaging. 1638 Repeat troponin flat. 1640 Upon re-evaluation, the patient continues to express intermittent dizziness. /60. HR 50 bpm.    1654 Repeat ECG interpretative by me. Heart rate 50 bpm.  Sinus bradycardia with first-degree AV block. T wave inversions in the lateral leads. 1657 Reaching out to Cardiology. Dr. Jose G Forbes for recommendations. 56 OK for [facility] admission. Will contact [facility] provider. SBIRT 22yo+      Flowsheet Row Most Recent Value   Initial Alcohol Screen: US AUDIT-C     1. How often do you have a drink containing alcohol? 0 Filed at: 11/16/2023 1433   2. How many drinks containing alcohol do you have on a typical day you are drinking? 0 Filed at: 11/16/2023 1433   3a. Male UNDER 65: How often do you have five or more drinks on one occasion? 0 Filed at: 11/16/2023 1433   3b. FEMALE Any Age, or MALE 65+: How often do you have 4 or more drinks on one occassion? 0 Filed at: 11/16/2023 1433   Audit-C Score 0 Filed at: 11/16/2023 1433   EFREN: How many times in the past year have you. .. Used an illegal drug or used a prescription medication for non-medical reasons? Never Filed at: 11/16/2023 1433                      Medical Decision Making  The differential diagnoses include but are not limited to hypertensive emergency, hypertensive urgency, SAH, ACS  Vital signs reviewed. /100 upon arrival. No focal deficits, denying dizziness upon my initial evaluation. IV labetalol 20 mg x 1 dose administered resulting in reduction of the BP but also bradycardia (SB). CT imaging unremarkable. Having intermittent dizziness/lightheadedness throughout the course of treatment. No focal deficit. ECG with lateral T wave inversions--per chart review and discussion with [facility], these changes are chronic. Troponin x 2 negative. Admitted to [facility] for further management. Problems Addressed:  Dizziness: acute illness or injury  Hypertensive urgency: acute illness or injury    Amount and/or Complexity of Data Reviewed  External Data Reviewed: ECG and notes. Labs: ordered. Decision-making details documented in ED Course. Radiology: ordered. Decision-making details documented in ED Course. ECG/medicine tests: ordered and independent interpretation performed. Decision-making details documented in ED Course. Risk  Prescription drug management. Decision regarding hospitalization. Disposition  Final diagnoses:   Hypertensive urgency   Dizziness     Time reflects when diagnosis was documented in both MDM as applicable and the Disposition within this note       Time User Action Codes Description Comment    11/16/2023  5:41 PM Cheng Barges Add [I16.0] Hypertensive urgency     11/16/2023  5:42 PM Cheng Barges Add [R42] Dizziness           ED Disposition       ED Disposition   Admit    Condition   Stable    Date/Time   Thu Nov 16, 2023 1741    Comment   Case was discussed with Dr. Sofie Mello and the patient's admission status was agreed to be Admission Status: inpatient status to the service of Dr. Sofie Mello .                Follow-up Information    None         Current Discharge Medication List        CONTINUE these medications which have NOT CHANGED    Details   Candesartan Cilexetil-HCTZ 32-25 MG TABS Take 1 tablet by mouth in the morning      LANTUS SOLOSTAR 100 units/mL injection pen 15 Units 2 (two) times a day      metFORMIN (GLUCOPHAGE) 1000 MG tablet 1,000 mg 2 (two) times a day with meals      NOVOLOG FLEXPEN 100 units/mL injection pen Inject under the skin 4 (four) times a day Sliding scale with meals and at bedtime      pantoprazole (PROTONIX) 40 mg tablet Take 40 mg by mouth daily      rosuvastatin (CRESTOR) 20 MG tablet Take 20 mg by mouth daily      tadalafil (CIALIS) 5 MG tablet Take 1 tablet (5 mg total) by mouth daily  Qty: 90 tablet, Refills: 3 Comments: Drug NOT covered by insurance. Patient will be using GoodRx: BIN#:  [id number] - PCN#:  [id number] - GRP#: [id number] - MEMBER ID#: [id number] - COST: $19.15 - $26.65. Please process accordingly. Thank you! (2/14/23)  Associated Diagnoses: Nocturia; BPH without obstruction/lower urinary tract symptoms; Frequency of urination             No discharge procedures on file.     PDMP Review       None            ED Provider  Electronically Signed by             Glory Baptiste DO  11/17/23 3392

## 2023-11-16 NOTE — ED NOTES
Patient transported to [address] [place] [address], [address] [place] [address]  11/16/23 3885

## 2023-11-16 NOTE — ASSESSMENT & PLAN NOTE
POA with dizziness overnight after standing up too quickly. Has been checking BP at home with -180  Candesartan-hctz at home - can continue inpatient   Given labetalol 20mg once in ED and BP dropped from 220 to 130 SBP with bradycardia 50s  Monitor BP overnight and make changes as needed  Hydralazine prn   Echo pending  EKG with chronic ischemic changes.  No chest pain and no troponin elevations  Orthostatics

## 2023-11-16 NOTE — PROGRESS NOTES
[facility]        NAME: Jeffry Zacarias is a 68 y.o. male  : 1950    MRN: [id number]  DATE: 2023  TIME: 1:24 PM    Assessment and Plan   Dizziness [R42]  1. Dizziness  ECG 12 lead    Transfer to other facility      2. Hypertensive urgency  Transfer to other facility            Patient Instructions   Go to ED    Follow up with PCP in 3-5 days. Proceed to  ER if symptoms worsen. Chief Complaint     Chief Complaint   Patient presents with    Dizziness     Dizziness since this morning          History of Present Illness       Patient is a 80-year-old male with significant past medical history of hypertension and diabetes Luce Saint the office complaining of dizziness since this morning. States he woke up this morning and felt dizzy. States he was so dizzy that he had to hold onto the wall so he would not fall to the ground while he went to the restroom. States he felt himself leaning to one side. Symptoms are significantly worse with head movements and while ambulating. Symptoms are improved since this morning but still present. Denies headache, vision changes, photophobia, nausea, vomiting, slurred speech, chest pain, shortness of breath, unilateral weakness. Denies head injury or recent fall. Denies prior similar episodes. Denies any medication changes. Review of Systems   Review of Systems   Constitutional:  Negative for fatigue and fever. HENT:  Negative for congestion and sore throat. Eyes:  Negative for photophobia and visual disturbance. Respiratory:  Negative for cough and shortness of breath. Cardiovascular:  Negative for chest pain and palpitations. Gastrointestinal:  Negative for abdominal pain, diarrhea, nausea and vomiting. Skin:  Negative for rash. Neurological:  Positive for dizziness and light-headedness. Negative for syncope, facial asymmetry, speech difficulty, weakness and headaches. Psychiatric/Behavioral:  Negative for confusion. Current Medications       Current Outpatient Medications:     Candesartan Cilexetil-HCTZ 32-25 MG TABS, , Disp: , Rfl:     LANTUS SOLOSTAR 100 units/mL injection pen, , Disp: , Rfl:     metFORMIN (GLUCOPHAGE) 1000 MG tablet, , Disp: , Rfl:     NOVOLOG FLEXPEN 100 units/mL injection pen, , Disp: , Rfl:     pantoprazole (PROTONIX) 40 mg tablet, , Disp: , Rfl:     rosuvastatin (CRESTOR) 20 MG tablet, Take 20 mg by mouth daily, Disp: , Rfl:     tadalafil (CIALIS) 5 MG tablet, Take 1 tablet (5 mg total) by mouth daily, Disp: 90 tablet, Rfl: 3    Current Allergies     Allergies as of 11/16/2023    (No Known Allergies)            The following portions of the patient's history were reviewed and updated as appropriate: allergies, current medications, past family history, past medical history, past social history, past surgical history and problem list.     Past Medical History:   Diagnosis Date    Arthritis     Diabetes mellitus ([address])     Hypertension        Past Surgical History:   Procedure Laterality Date    EYE SURGERY      KNEE ARTHROSCOPY         Family History   Problem Relation Age of Onset    Dementia Mother     No Known Problems Father          Medications have been verified. Objective   BP (!) 200/80   Pulse 60   Temp (!) 96.8 °F (36 °C) (Tympanic)   Resp 20   Ht 5' 8" (1.727 m)   Wt 96.2 kg (212 lb)   SpO2 100%   BMI 32.23 kg/m²   No LMP for male patient. Physical Exam     Physical Exam  Vitals and nursing note reviewed. Constitutional:       Appearance: Normal appearance. He is well-developed. HENT:      Head: Normocephalic and atraumatic. Right Ear: Hearing, tympanic membrane, ear canal and external ear normal.      Left Ear: Hearing, tympanic membrane, ear canal and external ear normal.      Nose: Nose normal.      Mouth/Throat:      Pharynx: Uvula midline.    Eyes:      General: Lids are normal.      Conjunctiva/sclera: Conjunctivae normal.      Pupils: Pupils are equal, round, and reactive to light. Cardiovascular:      Rate and Rhythm: Normal rate and regular rhythm. Pulses: Normal pulses. Heart sounds: Normal heart sounds. No murmur heard. No friction rub. No gallop. Pulmonary:      Effort: Pulmonary effort is normal.      Breath sounds: Normal breath sounds. No wheezing, rhonchi or rales. Abdominal:      General: Bowel sounds are normal.      Palpations: Abdomen is soft. Tenderness: There is no abdominal tenderness. Musculoskeletal:         General: Normal range of motion. Cervical back: Normal range of motion and neck supple. Thoracic back: Normal.      Lumbar back: Normal.   Skin:     General: Skin is warm and dry. Capillary Refill: Capillary refill takes less than 2 seconds. Findings: No rash. Neurological:      General: No focal deficit present. Mental Status: He is alert. Cranial Nerves: Cranial nerves 2-12 are intact. Sensory: Sensation is intact. Motor: Motor function is intact. Coordination: Coordination is intact. Gait: Gait is intact. Deep Tendon Reflexes: Reflexes are normal and symmetric. Psychiatric:         Speech: Speech normal.         Behavior: Behavior normal.       EKG: Sinus bradycardia. No ROMAN or T wave depression.

## 2023-11-16 NOTE — H&P
[facility]  H&P  Name: Ayanna Baker 68 y.o. male I MRN: [id number]  Unit/Bed#: -01 I Date of Admission: 11/16/2023   Date of Service: 11/16/2023 I Hospital Day: 0      Assessment/Plan   * Hypertensive urgency  Assessment & Plan  POA with dizziness overnight after standing up too quickly. Has been checking BP at home with -180  Candesartan-hctz at home - can continue inpatient   Given labetalol 20mg once in ED and BP dropped from 220 to 130 SBP with bradycardia 50s  Monitor BP overnight and make changes as needed  Hydralazine prn   Echo pending  EKG with chronic ischemic changes. No chest pain and no troponin elevations  Orthostatics    Benign prostatic hyperplasia without lower urinary tract symptoms  Assessment & Plan  Continue cialis. Follows with urology    Type 2 diabetes mellitus without complication, with long-term current use of insulin ([address])  Assessment & Plan  Lab Results   Component Value Date    HGBA1C 7.3 (H) 10/24/2023       No results for input(s): "POCGLU" in the last 72 hours. Blood Sugar Average: Last 72 hrs:  Home Lantus 15 units twice daily, and Humalog sliding scale 3 times daily with meals  Will decrease lantus to 10 units BID while inpatient   Hypoglycemia protocol         VTE Pharmacologic Prophylaxis:   Moderate Risk (Score 3-4) - Pharmacological DVT Prophylaxis Ordered: enoxaparin (Lovenox). Code Status: Level 1 - Full Code   Discussion with family: Updated  (wife) at bedside. Anticipated Length of Stay: Patient will be admitted on an inpatient basis with an anticipated length of stay of greater than 2 midnights secondary to hypertensive urgency requiring BP management. Total Time Spent on Date of Encounter in care of patient: 60 mins.  This time was spent on one or more of the following: performing physical exam; counseling and coordination of care; obtaining or reviewing history; documenting in the medical record; reviewing/ordering tests, medications or procedures; communicating with other healthcare professionals and discussing with patient's family/caregivers. Chief Complaint: "I was dizzy last night"    History of Present Illness:  Frank Hickey is a 68 y.o. male with a PMH of HTN, BPH and DM2 who presents with dizziness. Patient states last night he got up out of bed to go to the bathroom and became dizzy. States that he could not tell if the room was spinning, but reached for the wall for balance. This quickly subsided. Reports he has been taking his blood pressures at home with systolic blood pressures between 180 and 190. Went to urgent care today due to his dizzy episode overnight and recommended ER evaluation. States if he has been feeling good all day, but became dizzy in the ED her blood pressure and heart rate dropped. That episode has also since subsided. Reports he has been taking all of his blood pressure medications as prescribed. Review of Systems:  Review of Systems   Constitutional:  Negative for diaphoresis, fatigue and fever. HENT:  Negative for congestion and sore throat. Respiratory:  Negative for chest tightness, shortness of breath and wheezing. Cardiovascular:  Negative for chest pain, palpitations and leg swelling. Gastrointestinal:  Negative for abdominal distention, abdominal pain, constipation, diarrhea, nausea and vomiting. Genitourinary:  Negative for difficulty urinating and dysuria. Musculoskeletal:  Negative for arthralgias, back pain and gait problem. Skin:  Negative for wound. Neurological:  Positive for dizziness. Negative for speech difficulty, weakness, light-headedness, numbness and headaches. Psychiatric/Behavioral:  Negative for agitation, behavioral problems and confusion.         Past Medical and Surgical History:   Past Medical History:   Diagnosis Date    Arthritis     Diabetes mellitus ([address])     Hypertension        Past Surgical History: Procedure Laterality Date    EYE SURGERY      KNEE ARTHROSCOPY         Meds/Allergies:  Prior to Admission medications    Medication Sig Start Date End Date Taking? Authorizing Provider   Candesartan Cilexetil-HCTZ 32-25 MG TABS  3/20/19  Yes Historical Provider, MD SIDDIQI SOLOSTAR 100 units/mL injection pen 15 Units 2 (two) times a day 3/20/19  Yes Historical Provider, MD   metFORMIN (GLUCOPHAGE) 1000 MG tablet 1,000 mg 2 (two) times a day with meals 1/18/19  Yes Historical Provider, MD   NOVOLOG FLEXPEN 100 units/mL injection pen Inject under the skin 4 (four) times a day Sliding scale with meals and at bedtime 3/20/19  Yes Historical Provider, MD   pantoprazole (PROTONIX) 40 mg tablet  3/20/19  Yes Historical Provider, MD   rosuvastatin (CRESTOR) 20 MG tablet Take 20 mg by mouth daily 8/30/22  Yes Historical Provider, MD   tadalafil (CIALIS) 5 MG tablet Take 1 tablet (5 mg total) by mouth daily 2/14/23  Yes Lashawn SpeakerDESTIN     I have reviewed home medications with patient personally.     Allergies: No Known Allergies    Social History:  Marital Status: /Civil Union   Patient Pre-hospital Living Situation: Home  Patient Pre-hospital Level of Mobility: walks  Patient Pre-hospital Diet Restrictions: None  Substance Use History:   Social History     Substance and Sexual Activity   Alcohol Use Not Currently     Social History     Tobacco Use   Smoking Status Never   Smokeless Tobacco Never     Social History     Substance and Sexual Activity   Drug Use Not Currently       Family History:  Family History   Problem Relation Age of Onset    Dementia Mother     No Known Problems Father        Physical Exam:     Vitals:   Blood Pressure: 134/60 (11/16/23 1600)  Pulse: (!) 48 (11/16/23 1645)  Temperature: 97.9 °F (36.6 °C) (11/16/23 1431)  Temp Source: Temporal (11/16/23 1431)  Respirations: 15 (11/16/23 1645)  Height: 5' 8" (172.7 cm) (11/16/23 1431)  Weight - Scale: 96.2 kg (212 lb) (11/16/23 1431)  SpO2: 98 % (11/16/23 1645)    Physical Exam  Vitals reviewed. Constitutional:       General: He is not in acute distress. Appearance: Normal appearance. He is obese. He is not ill-appearing. HENT:      Head: Normocephalic and atraumatic. Nose: Nose normal.      Mouth/Throat:      Mouth: Mucous membranes are moist.      Pharynx: Oropharynx is clear. Eyes:      Extraocular Movements: Extraocular movements intact. Conjunctiva/sclera: Conjunctivae normal.   Cardiovascular:      Rate and Rhythm: Normal rate and regular rhythm. Pulses: Normal pulses. Heart sounds: Normal heart sounds. No murmur heard. Pulmonary:      Effort: Pulmonary effort is normal. No respiratory distress. Breath sounds: Normal breath sounds. No wheezing. Abdominal:      General: Abdomen is flat. Bowel sounds are normal. There is no distension. Palpations: Abdomen is soft. Tenderness: There is no abdominal tenderness. There is no guarding. Musculoskeletal:         General: Normal range of motion. Cervical back: Normal range of motion. Right lower leg: No edema. Left lower leg: No edema. Skin:     General: Skin is warm. Neurological:      General: No focal deficit present. Mental Status: He is alert and oriented to person, place, and time. Mental status is at baseline. Cranial Nerves: No cranial nerve deficit. Sensory: No sensory deficit. Motor: No weakness. Psychiatric:         Mood and Affect: Mood normal.         Behavior: Behavior normal.         Thought Content:  Thought content normal.         Judgment: Judgment normal.          Additional Data:     Lab Results:  Results from last 7 days   Lab Units 11/16/23  1455   WBC Thousand/uL 9.07   HEMOGLOBIN g/dL 13.6   HEMATOCRIT % 42.4   PLATELETS Thousands/uL 189   NEUTROS PCT % 75   LYMPHS PCT % 17   MONOS PCT % 7   EOS PCT % 1     Results from last 7 days   Lab Units 11/16/23  1455   SODIUM mmol/L 138   POTASSIUM mmol/L 4. 1   CHLORIDE mmol/L 101   CO2 mmol/L 28   BUN mg/dL 23   CREATININE mg/dL 1.15   ANION GAP mmol/L 9   CALCIUM mg/dL 10.0   GLUCOSE RANDOM mg/dL 186*                       Lines/Drains:  Invasive Devices       Peripheral Intravenous Line  Duration             Peripheral IV 11/16/23 Right;Ventral (anterior) Forearm <1 day                        Imaging: Reviewed radiology reports from this admission including: CT head  CT head without contrast   Final Result by Jackelyn Christianson MD (11/16 1626)      No acute intracranial abnormality. Chronic microangiopathic changes. Workstation performed: [id number]             EKG and Other Studies Reviewed on Admission:   EKG: Sinus Bradycardia. HR 51.    ** Please Note: This note has been constructed using a voice recognition system.  **

## 2023-11-17 ENCOUNTER — APPOINTMENT (INPATIENT)
Dept: NON INVASIVE DIAGNOSTICS | Facility: HOSPITAL | Age: 73
DRG: 305 | End: 2023-11-17
Payer: MEDICARE

## 2023-11-17 LAB
ANION GAP SERPL CALCULATED.3IONS-SCNC: 8 MMOL/L
AORTIC ROOT: 3.1 CM
AORTIC VALVE MEAN VELOCITY: 26.9 M/S
APICAL FOUR CHAMBER EJECTION FRACTION: 61 %
ATRIAL RATE: 44 BPM
ATRIAL RATE: 50 BPM
AV AREA BY CONTINUOUS VTI: 1 CM2
AV AREA PEAK VELOCITY: 0.9 CM2
AV LVOT MEAN GRADIENT: 3 MMHG
AV LVOT PEAK GRADIENT: 5 MMHG
AV MEAN GRADIENT: 36 MMHG
AV PEAK GRADIENT: 64 MMHG
AV VALVE AREA: 1.01 CM2
AV VELOCITY RATIO: 0.29
BUN SERPL-MCNC: 20 MG/DL (ref 5–25)
CALCIUM SERPL-MCNC: 9.7 MG/DL (ref 8.4–10.2)
CHLORIDE SERPL-SCNC: 102 MMOL/L (ref 96–108)
CO2 SERPL-SCNC: 25 MMOL/L (ref 21–32)
CREAT SERPL-MCNC: 1.12 MG/DL (ref 0.6–1.3)
DOP CALC AO PEAK VEL: 4.01 M/S
DOP CALC AO VTI: 86.04 CM
DOP CALC LVOT AREA: 3.14 CM2
DOP CALC LVOT CARDIAC INDEX: 8.15 L/MIN/M2
DOP CALC LVOT CARDIAC OUTPUT: 17.02 L/MIN
DOP CALC LVOT DIAMETER: 2 CM
DOP CALC LVOT PEAK VEL VTI: 27.78 CM
DOP CALC LVOT PEAK VEL: 1.17 M/S
DOP CALC LVOT STROKE INDEX: 42.6 ML/M2
DOP CALC LVOT STROKE VOLUME: 87.23
E WAVE DECELERATION TIME: 124 MS
E/A RATIO: 1.68
ERYTHROCYTE [DISTWIDTH] IN BLOOD BY AUTOMATED COUNT: 15.2 % (ref 11.6–15.1)
FRACTIONAL SHORTENING: 35 (ref 28–44)
GFR SERPL CREATININE-BSD FRML MDRD: 64 ML/MIN/1.73SQ M
GLUCOSE SERPL-MCNC: 148 MG/DL (ref 65–140)
GLUCOSE SERPL-MCNC: 213 MG/DL (ref 65–140)
GLUCOSE SERPL-MCNC: 213 MG/DL (ref 65–140)
GLUCOSE SERPL-MCNC: 215 MG/DL (ref 65–140)
GLUCOSE SERPL-MCNC: 248 MG/DL (ref 65–140)
HCT VFR BLD AUTO: 42 % (ref 36.5–49.3)
HGB BLD-MCNC: 13.5 G/DL (ref 12–17)
INTERVENTRICULAR SEPTUM IN DIASTOLE (PARASTERNAL SHORT AXIS VIEW): 1.4 CM
INTERVENTRICULAR SEPTUM: 1.4 CM (ref 0.6–1.1)
LAAS-AP2: 14.4 CM2
LAAS-AP4: 17.5 CM2
LEFT ATRIUM SIZE: 4.5 CM
LEFT ATRIUM VOLUME (MOD BIPLANE): 43 ML
LEFT ATRIUM VOLUME INDEX (MOD BIPLANE): 20.6 ML/M2
LEFT INTERNAL DIMENSION IN SYSTOLE: 3.1 CM (ref 2.1–4)
LEFT VENTRICULAR INTERNAL DIMENSION IN DIASTOLE: 4.8 CM (ref 3.5–6)
LEFT VENTRICULAR POSTERIOR WALL IN END DIASTOLE: 1.4 CM
LEFT VENTRICULAR STROKE VOLUME: 69 ML
LVSV (TEICH): 69 ML
MAGNESIUM SERPL-MCNC: 2.1 MG/DL (ref 1.9–2.7)
MCH RBC QN AUTO: 26.9 PG (ref 26.8–34.3)
MCHC RBC AUTO-ENTMCNC: 32.1 G/DL (ref 31.4–37.4)
MCV RBC AUTO: 84 FL (ref 82–98)
MV E'TISSUE VEL-SEP: 7 CM/S
MV PEAK A VEL: 0.44 M/S
MV PEAK E VEL: 74 CM/S
P AXIS: 60 DEGREES
P AXIS: 67 DEGREES
PLATELET # BLD AUTO: 198 THOUSANDS/UL (ref 149–390)
PMV BLD AUTO: 11.4 FL (ref 8.9–12.7)
POTASSIUM SERPL-SCNC: 4.1 MMOL/L (ref 3.5–5.3)
PR INTERVAL: 204 MS
PR INTERVAL: 218 MS
QRS AXIS: 54 DEGREES
QRS AXIS: 55 DEGREES
QRSD INTERVAL: 100 MS
QRSD INTERVAL: 90 MS
QT INTERVAL: 446 MS
QT INTERVAL: 446 MS
QTC INTERVAL: 381 MS
QTC INTERVAL: 406 MS
RBC # BLD AUTO: 5.01 MILLION/UL (ref 3.88–5.62)
RIGHT ATRIUM AREA SYSTOLE A4C: 14.5 CM2
RIGHT VENTRICLE ID DIMENSION: 2.5 CM
SL CV LEFT ATRIUM LENGTH A2C: 4.9 CM
SL CV LV EF: 60
SL CV PED ECHO LEFT VENTRICLE DIASTOLIC VOLUME (MOD BIPLANE) 2D: 107 ML
SL CV PED ECHO LEFT VENTRICLE SYSTOLIC VOLUME (MOD BIPLANE) 2D: 38 ML
SODIUM SERPL-SCNC: 135 MMOL/L (ref 135–147)
T WAVE AXIS: 138 DEGREES
T WAVE AXIS: 139 DEGREES
TR MAX PG: 18 MMHG
TR PEAK VELOCITY: 2.1 M/S
TRICUSPID ANNULAR PLANE SYSTOLIC EXCURSION: 2.2 CM
TRICUSPID VALVE PEAK REGURGITATION VELOCITY: 2.12 M/S
VENTRICULAR RATE: 44 BPM
VENTRICULAR RATE: 50 BPM
WBC # BLD AUTO: 8.14 THOUSAND/UL (ref 4.31–10.16)

## 2023-11-17 PROCEDURE — 97161 PT EVAL LOW COMPLEX 20 MIN: CPT

## 2023-11-17 PROCEDURE — 80048 BASIC METABOLIC PNL TOTAL CA: CPT

## 2023-11-17 PROCEDURE — 82948 REAGENT STRIP/BLOOD GLUCOSE: CPT

## 2023-11-17 PROCEDURE — 83735 ASSAY OF MAGNESIUM: CPT

## 2023-11-17 PROCEDURE — 85027 COMPLETE CBC AUTOMATED: CPT

## 2023-11-17 PROCEDURE — 99232 SBSQ HOSP IP/OBS MODERATE 35: CPT

## 2023-11-17 PROCEDURE — 93306 TTE W/DOPPLER COMPLETE: CPT

## 2023-11-17 PROCEDURE — 97165 OT EVAL LOW COMPLEX 30 MIN: CPT

## 2023-11-17 RX ORDER — INSULIN GLARGINE 100 [IU]/ML
15 INJECTION, SOLUTION SUBCUTANEOUS EVERY 12 HOURS SCHEDULED
Status: DISCONTINUED | OUTPATIENT
Start: 2023-11-17 | End: 2023-11-18 | Stop reason: HOSPADM

## 2023-11-17 RX ORDER — INSULIN LISPRO 100 [IU]/ML
5 INJECTION, SOLUTION INTRAVENOUS; SUBCUTANEOUS
Status: DISCONTINUED | OUTPATIENT
Start: 2023-11-17 | End: 2023-11-18 | Stop reason: HOSPADM

## 2023-11-17 RX ORDER — NIFEDIPINE 30 MG/1
30 TABLET, EXTENDED RELEASE ORAL DAILY
Status: DISCONTINUED | OUTPATIENT
Start: 2023-11-17 | End: 2023-11-18

## 2023-11-17 RX ADMIN — INSULIN LISPRO 5 UNITS: 100 INJECTION, SOLUTION INTRAVENOUS; SUBCUTANEOUS at 12:17

## 2023-11-17 RX ADMIN — INSULIN LISPRO 5 UNITS: 100 INJECTION, SOLUTION INTRAVENOUS; SUBCUTANEOUS at 16:50

## 2023-11-17 RX ADMIN — ATORVASTATIN CALCIUM 40 MG: 40 TABLET, FILM COATED ORAL at 16:50

## 2023-11-17 RX ADMIN — HYDROCHLOROTHIAZIDE 25 MG: 25 TABLET ORAL at 08:06

## 2023-11-17 RX ADMIN — ENOXAPARIN SODIUM 40 MG: 40 INJECTION SUBCUTANEOUS at 08:06

## 2023-11-17 RX ADMIN — INSULIN LISPRO 3 UNITS: 100 INJECTION, SOLUTION INTRAVENOUS; SUBCUTANEOUS at 12:17

## 2023-11-17 RX ADMIN — LOSARTAN POTASSIUM 100 MG: 50 TABLET, FILM COATED ORAL at 08:06

## 2023-11-17 RX ADMIN — PANTOPRAZOLE SODIUM 40 MG: 40 TABLET, DELAYED RELEASE ORAL at 05:00

## 2023-11-17 RX ADMIN — INSULIN GLARGINE 10 UNITS: 100 INJECTION, SOLUTION SUBCUTANEOUS at 08:07

## 2023-11-17 RX ADMIN — NIFEDIPINE 30 MG: 30 TABLET, FILM COATED, EXTENDED RELEASE ORAL at 12:18

## 2023-11-17 RX ADMIN — INSULIN LISPRO 2 UNITS: 100 INJECTION, SOLUTION INTRAVENOUS; SUBCUTANEOUS at 08:07

## 2023-11-17 RX ADMIN — INSULIN GLARGINE 15 UNITS: 100 INJECTION, SOLUTION SUBCUTANEOUS at 21:33

## 2023-11-17 RX ADMIN — HYDRALAZINE HYDROCHLORIDE 5 MG: 20 INJECTION INTRAMUSCULAR; INTRAVENOUS at 02:12

## 2023-11-17 RX ADMIN — HYDRALAZINE HYDROCHLORIDE 5 MG: 20 INJECTION INTRAMUSCULAR; INTRAVENOUS at 21:41

## 2023-11-17 NOTE — PLAN OF CARE
Problem: PAIN - ADULT  Goal: Verbalizes/displays adequate comfort level or baseline comfort level  Description: Interventions:  - Encourage patient to monitor pain and request assistance  - Assess pain using appropriate pain scale  - Administer analgesics based on type and severity of pain and evaluate response  - Implement non-pharmacological measures as appropriate and evaluate response  - Consider cultural and social influences on pain and pain management  - Notify physician/advanced practitioner if interventions unsuccessful or patient reports new pain  Outcome: Progressing     Problem: INFECTION - ADULT  Goal: Absence or prevention of progression during hospitalization  Description: INTERVENTIONS:  - Assess and monitor for signs and symptoms of infection  - Monitor lab/diagnostic results  - Monitor all insertion sites, i.e. indwelling lines, tubes, and drains  - Monitor endotracheal if appropriate and nasal secretions for changes in amount and color  - [place] appropriate cooling/warming therapies per order  - Administer medications as ordered  - Instruct and encourage patient and family to use good hand hygiene technique  - Identify and instruct in appropriate isolation precautions for identified infection/condition  Outcome: Progressing  Goal: Absence of fever/infection during neutropenic period  Description: INTERVENTIONS:  - Monitor WBC    Outcome: Progressing     Problem: SAFETY ADULT  Goal: Patient will remain free of falls  Description: INTERVENTIONS:  - Educate patient/family on patient safety including physical limitations  - Instruct patient to call for assistance with activity   - Consult OT/PT to assist with strengthening/mobility   - Keep Call bell within reach  - Keep bed low and locked with side rails adjusted as appropriate  - Keep care items and personal belongings within reach  - Initiate and maintain comfort rounds  - Make Fall Risk Sign visible to staff  - Offer Toileting every 2 Hours, in advance of need  - Initiate/Maintain alarm  - Obtain necessary fall risk management equipment  - Apply yellow socks and bracelet for high fall risk patients  - Consider moving patient to room near nurses station  Outcome: Progressing  Goal: Maintain or return to baseline ADL function  Description: INTERVENTIONS:  -  Assess patient's ability to carry out ADLs; assess patient's baseline for ADL function and identify physical deficits which impact ability to perform ADLs (bathing, care of mouth/teeth, toileting, grooming, dressing, etc.)  - Assess/evaluate cause of self-care deficits   - Assess range of motion  - Assess patient's mobility; develop plan if impaired  - Assess patient's need for assistive devices and provide as appropriate  - Encourage maximum independence but intervene and supervise when necessary  - Involve family in performance of ADLs  - Assess for home care needs following discharge   - Consider OT consult to assist with ADL evaluation and planning for discharge  - Provide patient education as appropriate  Outcome: Progressing  Goal: Maintains/Returns to pre admission functional level  Description: INTERVENTIONS:  - Perform AM-PAC 6 Click Basic Mobility/ Daily Activity assessment daily.  - Set and communicate daily mobility goal to care team and patient/family/caregiver. - Collaborate with rehabilitation services on mobility goals if consulted  - Perform Range of Motion 3 times a day. - Reposition patient every 2 hours.   - Dangle patient 3 times a day  - Stand patient 3 times a day  - Ambulate patient 3 times a day  - Out of bed to chair 3 times a day   - Out of bed for meals 3 times a day  - Out of bed for toileting  - Record patient progress and toleration of activity level   Outcome: Progressing     Problem: DISCHARGE PLANNING  Goal: Discharge to home or other facility with appropriate resources  Description: INTERVENTIONS:  - Identify barriers to discharge w/patient and caregiver  - Arrange for needed discharge resources and transportation as appropriate  - Identify discharge learning needs (meds, wound care, etc.)  - Arrange for interpretive services to assist at discharge as needed  - Refer to Case Management Department for coordinating discharge planning if the patient needs post-hospital services based on physician/advanced practitioner order or complex needs related to functional status, cognitive ability, or social support system  Outcome: Progressing     Problem: Knowledge Deficit  Goal: Patient/family/caregiver demonstrates understanding of disease process, treatment plan, medications, and discharge instructions  Description: Complete learning assessment and assess knowledge base.   Interventions:  - Provide teaching at level of understanding  - Provide teaching via preferred learning methods  Outcome: Progressing

## 2023-11-17 NOTE — ASSESSMENT & PLAN NOTE
POA with dizziness overnight after standing up too quickly. Has been checking BP at home with -180  Candesartan-hctz at home - can continue inpatient   Given labetalol 20mg once in ED and BP dropped from 220 to 130 SBP with bradycardia 50s  Hydralazine prn   Echo 60% EF  EKG with chronic ischemic changes. No chest pain and no troponin elevations  Orthostatics normal  Started on procardia 30mg.  Will increase as needed

## 2023-11-17 NOTE — PHYSICAL THERAPY NOTE
PHYSICAL THERAPY EVALUATION    NAME:  Andra Steven  DATE: 11/17/23    AGE:   68 y.o. Mrn:   [id number]  ADMIT DX:  Dizziness [R42]  Hypertension [I10]  Hypertensive urgency [I16.0]    Past Medical History:   Diagnosis Date    Arthritis     Diabetes mellitus ([address])     Hypertension      Length Of Stay: 1  Performed at least 2 patient identifiers during session: Name and Birthday  PHYSICAL THERAPY EVALUATION :        11/17/23 1044   PT Last Visit   PT Visit Date 11/17/23   Note Type   Note type Evaluation   Pain Assessment   Pain Assessment Tool 0-10   Pain Score No Pain   Restrictions/Precautions   Weight Bearing Precautions Per Order No   Home Living   Type of Home House   Home Layout Performs ADLs on one level; Able to live on main level with bedroom/bathroom; Other (Comment)  (flight stairs to enter with L rail)   Bathroom Shower/Tub Tub/shower unit   Bathroom Toilet Standard   Bathroom Equipment Toilet raiser; Other (Comment)  (grab bar outside of shower)   Home Equipment Other (Comment)  (none)   Prior Function   Level of [place] Independent with ADLs; Independent with functional mobility; Independent with IADLS   Lives With Spouse   Receives Help From Family   IADLs Independent with meal prep; Independent with driving; Independent with medication management   Falls in the last 6 months 0   Vocational Retired   General   Family/Caregiver Present No   Cognition   Overall Cognitive Status WFL   Arousal/Participation Cooperative   Orientation Level Oriented X4   RLE Assessment   RLE Assessment WFL   LLE Assessment   LLE Assessment WFL   Bed Mobility   Supine to Sit 7  Independent   Transfers   Sit to Stand 7  Independent   Stand to Sit 7  Independent   Stand pivot 7  Independent   Additional Comments no AD   Ambulation/Elevation   Gait pattern Step through pattern   Gait Assistance 7  Independent   Assistive Device None   Distance 20 ft   Ambulation/Elevation Additional Comments PT limits ambulation distance due to elevated BP   Balance   Static Sitting Normal   Dynamic Sitting Normal   Static Standing Good   Dynamic Standing Good   Ambulatory Good   Activity Tolerance   Activity Tolerance Patient limited by fatigue   Medical Staff Made Aware OT Wendy   Assessment   Prognosis Excellent   Barriers to Discharge Comments elevated BP   Goals   STG Expiration Date   (Eval only)   Plan   PT Frequency   (Eval only)   Discharge Recommendation   Rehab Resource Intensity Level, PT No post-acute rehabilitation needs   AM-PAC Basic Mobility Inpatient   Turning in Flat Bed Without Bedrails 4   Lying on Back to Sitting on Edge of Flat Bed Without Bedrails 4   Moving Bed to Chair 4   Standing Up From Chair Using Arms 4   Walk in Room 4   Climb 3-5 Stairs With Railing 4   Basic Mobility Inpatient Raw Score 24   Basic Mobility Standardized Score 57.68   Highest Level Of Mobility   JH-HLM Goal 8: Walk 250 feet or more   JH-HLM Achieved 6: Walk 10 steps or more  (limited distance due to BP)   End of Consult   Patient Position at End of Consult Seated edge of bed; All needs within reach      11/17/23 1053   Vitals   Pulse (!) 51   Blood Pressure (!) 199/82   MAP (mmHg) 121      11/17/23 1055   Vitals   Pulse 57   Blood Pressure (!) 198/84   MAP (mmHg) 122     (Please find full objective findings from PT assessment regarding body systems outlined above). Assessment: Pt is a 68 y.o. male seen for PT evaluation s/p admission to [address] [place] [address] on 11/16/2023 with Hypertensive urgency. Order placed for PT services. Upon evaluation: Pt presents at an IND level for functional mobility. Pt's clinical presentation is currently stable  from a PT perspective. Pt's PMHx and comorbidities that may affect physical performance and progress include: HTN.  From PT/mobility standpoint, recommendation at time of d/c would be No Post-Acute Rehabilitation Needs pending progress in order to reduce fall risk and maximize pt's functional independence and consistency with mobility in order to facilitate return to PLOF. The patient's AM-PAC Basic Mobility Inpatient Short Form Raw Score is 24. A Raw score of greater than 16 suggests the patient may benefit from discharge to home. Please also refer to the recommendation of the Physical Therapist for safe discharge planning.         Poli Clements, PT,DPT

## 2023-11-17 NOTE — ASSESSMENT & PLAN NOTE
Lab Results   Component Value Date    HGBA1C 7.3 (H) 10/24/2023       Recent Labs     11/16/23  1852 11/16/23  2049 11/17/23  0726 11/17/23  1122   POCGLU 152* 227* 215* 248*       Blood Sugar Average: Last 72 hrs:  Continue home  Lantus 15 units twice daily, and Humalog sliding scale 3 times daily with meals  Patient reports that his sliding scale is usually 15-25 units TID with meals at home.  Will add on 5 units humalog TID with meals while inpatient  Hypoglycemia protocol

## 2023-11-17 NOTE — OCCUPATIONAL THERAPY NOTE
Occupational Therapy Evaluation     Patient Name: Cory Garcia  AHXCR'U Date: 11/17/2023  Problem List  Principal Problem:    Hypertensive urgency  Active Problems:    Type 2 diabetes mellitus without complication, with long-term current use of insulin (HCC)    Benign prostatic hyperplasia without lower urinary tract symptoms    Past Medical History  Past Medical History:   Diagnosis Date    Arthritis     Diabetes mellitus ([address])     Hypertension      Past Surgical History  Past Surgical History:   Procedure Laterality Date    EYE SURGERY      KNEE ARTHROSCOPY          11/17/23 1045   Note Type   Note type Evaluation   Pain Assessment   Pain Assessment Tool 0-10   Pain Score No Pain   Home Living   Type of Home House  (12-13 ROMAN L HR)   Home Layout One level;Performs ADLs on one level; Able to live on main level with bedroom/bathroom   Bathroom Shower/Tub Tub/shower unit   Bathroom Toilet Standard  (Also has raised toilet)   [facility] [place]   (Grab bar outside shower)   Home Equipment   (denies owning DME)   Additional Comments Pt reports living in a [address] with 12-13 ROMAN L HR. Prior Function   Level of [place] Independent with ADLs; Independent with functional mobility; Independent with IADLS   Lives With Spouse   Receives Help From Family   IADLs Independent with driving; Independent with meal prep; Independent with medication management   Falls in the last 6 months 0   Comments PTA, pt reports independence with ADLs, IADLs, and functional mobility without AD. ADL   UB Dressing Assistance 7  Independent   LB Dressing Assistance 7  Independent   Bed Mobility   Supine to Sit 7  Independent   Additional Comments Pt received supine in bed upon start of session. Pt supine > sit EOB independently. Transfers   Sit to Stand 7  Independent   Stand to Sit 7  Independent   Stand pivot 7  Independent   Additional Comments All functional transfers without AD. Pt sit <> stand independently.  Pt spt in room independently. Functional Mobility   Functional Mobility 7  Independent   Additional Comments Pt participated in short functional distance in room independently without AD. At end of session, pt seated EOB with call bell in reach and all needs met. Balance   Static Sitting Normal   Dynamic Sitting Normal   Static Standing Good   Dynamic Standing Good   Activity Tolerance   Activity Tolerance Patient limited by fatigue   Medical Staff Made Aware PT, Ernie   RUE Assessment   RUE Assessment WFL  (Strength grossly 4-/5)   LUE Assessment   LUE Assessment WFL  (Strength grossly 4-/5)   Hand Function   Gross Motor Coordination Functional   Fine Motor Coordination Functional   Vision-Basic Assessment   Current Vision Wears glasses all the time   Cognition   Overall Cognitive Status [facility]   Arousal/Participation Alert; Cooperative   Attention Within functional limits   Orientation Level Oriented X4   Memory Within functional limits   Following Commands Follows all commands and directions without difficulty   Assessment   Assessment Pt is a 68 y.o. male, admitted to [address] [place] [address] 11/16/2023 d/t experiencing dizziness and hypertensive urgency. Dx: Hypertensive urgency. Pt with PMHx impacting their performance during ADL tasks, including: BPH without lower urinary tract symptoms, DM II, HTN. Prior to admission to the hospital Pt was performing ADLs without physical assistance. IADLs without physical assistance. Functional transfers/ambulation without physical assistance. Cognitive status PTA was WNL. OT order placed to assess Pt's ADLs, cognitive status, and performance during functional tasks in order to maximize safety and independence while making most appropriate d/c recommendations. PT/OT co-evaluation completed at this time d/t safety concerns.  Pt's clinical presentation is currently stable  given new onset deficits that affect Pt's occupational performance and ability to safely return to OF including decrease activity tolerance, decrease activity engagement, and steps to enter home combined with medical complications of hypertension , abnormal CBC, and abnormal blood sugars. Personal factors affecting Pt at time of initial evaluation include: step(s) to enter environment. From an occupational therapy standpoint, No Post-Acute Rehabilitation Needs are recommended upon d/c. Pt is at his baseline level of function of independent and does not require acute OT services at this time. OT will sign off. If new concerns arise, please re-consult. Plan   OT Frequency Eval only   Discharge Recommendation   Rehab Resource Intensity Level, OT No post-acute rehabilitation needs   AM-PAC Daily Activity Inpatient   Lower Body Dressing 4   Bathing 4   Toileting 4   Upper Body Dressing 4   Grooming 4   Eating 4   Daily Activity Raw Score 24   Daily Activity Standardized Score (Calc for Raw Score >=11) 57.54   AM-PAC Applied Cognition Inpatient   Following a Speech/Presentation 4   Understanding Ordinary Conversation 4   Taking Medications 4   Remembering Where Things Are Placed or Put Away 4   Remembering List of 4-5 Errands 4   Taking Care of Complicated Tasks 4   Applied Cognition Raw Score 24   Applied Cognition Standardized Score 62.21       11/17/23 1053   Vitals   Pulse (!) 51   Blood Pressure (!) 199/82   MAP (mmHg) 121        11/17/23 1055   Vitals   Pulse 57   Blood Pressure (!) 198/84   MAP (mmHg) 122        Pt reporting they are near their baseline function and have no concerns regarding ADLs, IADLs or functional mobility upon return to home, therefore do not require acute OT services at this time. D/C OT effective this date. If new concerns arise, please re-consult.     [employer], MS, OTR/L

## 2023-11-17 NOTE — PROGRESS NOTES
[facility]  Progress Note  Name: Desirae Degroot I  MRN: [id number]  Unit/Bed#: -48 I Date of Admission: 11/16/2023   Date of Service: 11/17/2023 I Hospital Day: 1    Assessment/Plan   * Hypertensive urgency  Assessment & Plan  POA with dizziness overnight after standing up too quickly. Has been checking BP at home with -180  Candesartan-hctz at home - can continue inpatient   Given labetalol 20mg once in ED and BP dropped from 220 to 130 SBP with bradycardia 50s  Hydralazine prn   Echo 60% EF  EKG with chronic ischemic changes. No chest pain and no troponin elevations  Orthostatics normal  Started on procardia 30mg. Will increase as needed    Benign prostatic hyperplasia without lower urinary tract symptoms  Assessment & Plan  Continue cialis. Follows with urology    Type 2 diabetes mellitus without complication, with long-term current use of insulin [facility])  Assessment & Plan  Lab Results   Component Value Date    HGBA1C 7.3 (H) 10/24/2023       Recent Labs     11/16/23  1852 11/16/23  2049 11/17/23  0726 11/17/23  1122   POCGLU 152* 227* 215* 248*       Blood Sugar Average: Last 72 hrs:  Continue home  Lantus 15 units twice daily, and Humalog sliding scale 3 times daily with meals  Patient reports that his sliding scale is usually 15-25 units TID with meals at home. Will add on 5 units humalog TID with meals while inpatient  Hypoglycemia protocol             VTE Pharmacologic Prophylaxis:   Moderate Risk (Score 3-4) - Pharmacological DVT Prophylaxis Ordered: enoxaparin (Lovenox). Mobility:   Basic Mobility Inpatient Raw Score: 24  JH-HLM Goal: 8: Walk 250 feet or more  JH-HLM Achieved: 8: Walk 250 feet ot more  HLM Goal achieved. Continue to encourage appropriate mobility. Patient Centered Rounds: I performed bedside rounds with nursing staff today.    Discussions with Specialists or Other Care Team Provider: CM and nursing    Education and Discussions with Family / Patient: Updated  (wife) via phone. Total Time Spent on Date of Encounter in care of patient: This time was spent on one or more of the following: performing physical exam; counseling and coordination of care; obtaining or reviewing history; documenting in the medical record; reviewing/ordering tests, medications or procedures; communicating with other healthcare professionals and discussing with patient's family/caregivers. Current Length of Stay: 1 day(s)  Current Patient Status: Inpatient   Certification Statement: The patient will continue to require additional inpatient hospital stay due to requiring blood pressure management   Discharge Plan: Anticipate discharge in 24-48 hrs to home. Code Status: Level 1 - Full Code    Subjective:   Patient states that he is still feeling a little fuzzy because of his elevated blood pressure. Denies chest pain or shortness of breath. Objective:     Vitals:   Temp (24hrs), Av.7 °F (36.5 °C), Min:96.8 °F (36 °C), Max:98.1 °F (36.7 °C)    Temp:  [96.8 °F (36 °C)-98.1 °F (36.7 °C)] 97.9 °F (36.6 °C)  HR:  [40-68] 50  Resp:  [15-20] 18  BP: ()/() 187/144  SpO2:  [94 %-100 %] 97 %  Body mass index is 32.23 kg/m². Input and Output Summary (last 24 hours): Intake/Output Summary (Last 24 hours) at 2023 1216  Last data filed at 2023 0801  Gross per 24 hour   Intake 1350 ml   Output 275 ml   Net 1075 ml       Physical Exam:   Physical Exam  Vitals reviewed. Constitutional:       General: He is not in acute distress. Appearance: Normal appearance. He is obese. He is not ill-appearing. HENT:      Head: Normocephalic and atraumatic. Nose: Nose normal.      Mouth/Throat:      Mouth: Mucous membranes are moist.      Pharynx: Oropharynx is clear. Eyes:      Extraocular Movements: Extraocular movements intact.       Conjunctiva/sclera: Conjunctivae normal.   Cardiovascular:      Rate and Rhythm: Normal rate and regular rhythm. Pulses: Normal pulses. Heart sounds: Normal heart sounds. No murmur heard. Pulmonary:      Effort: Pulmonary effort is normal. No respiratory distress. Breath sounds: Normal breath sounds. No wheezing. Abdominal:      General: Abdomen is flat. Bowel sounds are normal. There is no distension. Palpations: Abdomen is soft. Tenderness: There is no abdominal tenderness. There is no guarding. Musculoskeletal:         General: Normal range of motion. Cervical back: Normal range of motion. Right lower leg: No edema. Left lower leg: No edema. Skin:     General: Skin is warm. Neurological:      General: No focal deficit present. Mental Status: He is alert and oriented to person, place, and time. Mental status is at baseline. Motor: No weakness. Psychiatric:         Mood and Affect: Mood normal.         Behavior: Behavior normal.         Thought Content:  Thought content normal.         Judgment: Judgment normal.          Additional Data:     Labs:  Results from last 7 days   Lab Units 11/17/23  0458 11/16/23  1455   WBC Thousand/uL 8.14 9.07   HEMOGLOBIN g/dL 13.5 13.6   HEMATOCRIT % 42.0 42.4   PLATELETS Thousands/uL 198 189   NEUTROS PCT %  --  75   LYMPHS PCT %  --  17   MONOS PCT %  --  7   EOS PCT %  --  1     Results from last 7 days   Lab Units 11/17/23  0458   SODIUM mmol/L 135   POTASSIUM mmol/L 4.1   CHLORIDE mmol/L 102   CO2 mmol/L 25   BUN mg/dL 20   CREATININE mg/dL 1.12   ANION GAP mmol/L 8   CALCIUM mg/dL 9.7   GLUCOSE RANDOM mg/dL 213*         Results from last 7 days   Lab Units 11/17/23  1122 11/17/23  0726 11/16/23  2049 11/16/23  1852   POC GLUCOSE mg/dl 248* 215* 227* 152*               Lines/Drains:  Invasive Devices       Peripheral Intravenous Line  Duration             Peripheral IV 11/16/23 Right;Ventral (anterior) Forearm <1 day                          Imaging: Reviewed radiology reports from this admission including: CT head    Recent Cultures (last 7 days):         Last 24 Hours Medication List:   Current Facility-Administered Medications   Medication Dose Route Frequency Provider Last Rate    acetaminophen  650 mg Oral Q6H PRN Cassandra Fountain, ANITA      atorvastatin  40 mg Oral Daily With [employer], ANITA      enoxaparin  40 mg Subcutaneous Daily Cassandra Fountain, ANITA      hydrALAZINE  5 mg Intravenous Q6H PRN Cassandra Fountain, ANITA      losartan  100 mg Oral Daily Cassandra Fountain, ANITA      And    hydrochlorothiazide  25 mg Oral Daily Cassandra Fountain, ANITA      insulin glargine  15 Units Subcutaneous Q12H [address] Cassandra Fountain, ANITA      insulin lispro  1-6 Units Subcutaneous TID AC Cassandra Fountain, ANITA      insulin lispro  5 Units Subcutaneous TID With Meals Cassandra Fountain, ANITA      NIFEdipine  30 mg Oral Daily Cassandra Fountain, ANITA      pantoprazole  40 mg Oral Early Morning Cassandra Fountain, ANITA      tadalafil  5 mg Oral Daily Cassandra Fountain, ANITA          Today, Patient Was Seen By: Alex Guzman PA-C    **Please Note: This note may have been constructed using a voice recognition system. **

## 2023-11-18 VITALS
BODY MASS INDEX: 32.13 KG/M2 | HEART RATE: 55 BPM | OXYGEN SATURATION: 97 % | RESPIRATION RATE: 19 BRPM | DIASTOLIC BLOOD PRESSURE: 79 MMHG | WEIGHT: 212 LBS | SYSTOLIC BLOOD PRESSURE: 145 MMHG | HEIGHT: 68 IN | TEMPERATURE: 98.2 F

## 2023-11-18 LAB
ANION GAP SERPL CALCULATED.3IONS-SCNC: 10 MMOL/L
BUN SERPL-MCNC: 25 MG/DL (ref 5–25)
CALCIUM SERPL-MCNC: 9.7 MG/DL (ref 8.4–10.2)
CHLORIDE SERPL-SCNC: 103 MMOL/L (ref 96–108)
CO2 SERPL-SCNC: 24 MMOL/L (ref 21–32)
CREAT SERPL-MCNC: 1.18 MG/DL (ref 0.6–1.3)
GFR SERPL CREATININE-BSD FRML MDRD: 60 ML/MIN/1.73SQ M
GLUCOSE SERPL-MCNC: 146 MG/DL (ref 65–140)
GLUCOSE SERPL-MCNC: 161 MG/DL (ref 65–140)
GLUCOSE SERPL-MCNC: 196 MG/DL (ref 65–140)
MAGNESIUM SERPL-MCNC: 2 MG/DL (ref 1.9–2.7)
POTASSIUM SERPL-SCNC: 4 MMOL/L (ref 3.5–5.3)
SODIUM SERPL-SCNC: 137 MMOL/L (ref 135–147)

## 2023-11-18 PROCEDURE — 99239 HOSP IP/OBS DSCHRG MGMT >30: CPT

## 2023-11-18 PROCEDURE — 80048 BASIC METABOLIC PNL TOTAL CA: CPT

## 2023-11-18 PROCEDURE — 82948 REAGENT STRIP/BLOOD GLUCOSE: CPT

## 2023-11-18 PROCEDURE — 83735 ASSAY OF MAGNESIUM: CPT

## 2023-11-18 RX ORDER — NIFEDIPINE 30 MG/1
60 TABLET, EXTENDED RELEASE ORAL DAILY
Qty: 60 TABLET | Refills: 0 | Status: SHIPPED | OUTPATIENT
Start: 2023-11-19 | End: 2023-12-19

## 2023-11-18 RX ORDER — NIFEDIPINE 30 MG/1
60 TABLET, EXTENDED RELEASE ORAL DAILY
Status: DISCONTINUED | OUTPATIENT
Start: 2023-11-19 | End: 2023-11-18 | Stop reason: HOSPADM

## 2023-11-18 RX ADMIN — ENOXAPARIN SODIUM 40 MG: 40 INJECTION SUBCUTANEOUS at 08:24

## 2023-11-18 RX ADMIN — INSULIN GLARGINE 15 UNITS: 100 INJECTION, SOLUTION SUBCUTANEOUS at 08:24

## 2023-11-18 RX ADMIN — NIFEDIPINE 30 MG: 30 TABLET, FILM COATED, EXTENDED RELEASE ORAL at 08:24

## 2023-11-18 RX ADMIN — INSULIN LISPRO 5 UNITS: 100 INJECTION, SOLUTION INTRAVENOUS; SUBCUTANEOUS at 08:24

## 2023-11-18 RX ADMIN — INSULIN LISPRO 2 UNITS: 100 INJECTION, SOLUTION INTRAVENOUS; SUBCUTANEOUS at 11:46

## 2023-11-18 RX ADMIN — LOSARTAN POTASSIUM 100 MG: 50 TABLET, FILM COATED ORAL at 08:24

## 2023-11-18 RX ADMIN — PANTOPRAZOLE SODIUM 40 MG: 40 TABLET, DELAYED RELEASE ORAL at 05:14

## 2023-11-18 RX ADMIN — HYDROCHLOROTHIAZIDE 25 MG: 25 TABLET ORAL at 08:24

## 2023-11-18 RX ADMIN — INSULIN LISPRO 5 UNITS: 100 INJECTION, SOLUTION INTRAVENOUS; SUBCUTANEOUS at 11:46

## 2023-11-18 NOTE — ASSESSMENT & PLAN NOTE
POA with dizziness overnight after standing up too quickly. Has been checking BP at home with -180  Candesartan-hctz at home - can continue inpatient   Given labetalol 20mg once in ED and BP dropped from 220 to 130 SBP with bradycardia 50s  Hydralazine prn   Echo 60% EF  EKG with chronic ischemic changes.  No chest pain and no troponin elevations  Orthostatics normal  Increase procardia to 60mg daily  Follow up with PCP for BP management

## 2023-11-18 NOTE — ASSESSMENT & PLAN NOTE
Lab Results   Component Value Date    HGBA1C 7.3 (H) 10/24/2023       Recent Labs     11/17/23  1610 11/17/23  2047 11/18/23  0730 11/18/23  1110   POCGLU 148* 213* 146* 196*         Blood Sugar Average: Last 72 hrs:  Continue home  Lantus 15 units twice daily, and Humalog sliding scale 3 times daily with meals  Patient reports that his sliding scale is usually 15-25 units TID with meals at home.  Will add on 5 units humalog TID with meals while inpatient  Hypoglycemia protocol

## 2023-11-18 NOTE — PLAN OF CARE
Problem: PAIN - ADULT  Goal: Verbalizes/displays adequate comfort level or baseline comfort level  Description: Interventions:  - Encourage patient to monitor pain and request assistance  - Assess pain using appropriate pain scale  - Administer analgesics based on type and severity of pain and evaluate response  - Implement non-pharmacological measures as appropriate and evaluate response  - Consider cultural and social influences on pain and pain management  - Notify physician/advanced practitioner if interventions unsuccessful or patient reports new pain  Outcome: Progressing     Problem: INFECTION - ADULT  Goal: Absence or prevention of progression during hospitalization  Description: INTERVENTIONS:  - Assess and monitor for signs and symptoms of infection  - Monitor lab/diagnostic results  - Monitor all insertion sites, i.e. indwelling lines, tubes, and drains  - Monitor endotracheal if appropriate and nasal secretions for changes in amount and color  - [place] appropriate cooling/warming therapies per order  - Administer medications as ordered  - Instruct and encourage patient and family to use good hand hygiene technique  - Identify and instruct in appropriate isolation precautions for identified infection/condition  Outcome: Progressing  Goal: Absence of fever/infection during neutropenic period  Description: INTERVENTIONS:  - Monitor WBC    Outcome: Progressing     Problem: SAFETY ADULT  Goal: Patient will remain free of falls  Description: INTERVENTIONS:  - Educate patient/family on patient safety including physical limitations  - Instruct patient to call for assistance with activity   - Consult OT/PT to assist with strengthening/mobility   - Keep Call bell within reach  - Keep bed low and locked with side rails adjusted as appropriate  - Keep care items and personal belongings within reach  - Initiate and maintain comfort rounds  - Make Fall Risk Sign visible to staff  - Offer Toileting every 2 Hours, in advance of need  - Apply yellow socks and bracelet for high fall risk patients  - Consider moving patient to room near nurses station  Outcome: Progressing  Goal: Maintain or return to baseline ADL function  Description: INTERVENTIONS:  -  Assess patient's ability to carry out ADLs; assess patient's baseline for ADL function and identify physical deficits which impact ability to perform ADLs (bathing, care of mouth/teeth, toileting, grooming, dressing, etc.)  - Assess/evaluate cause of self-care deficits   - Assess range of motion  - Assess patient's mobility; develop plan if impaired  - Assess patient's need for assistive devices and provide as appropriate  - Encourage maximum independence but intervene and supervise when necessary  - Involve family in performance of ADLs  - Assess for home care needs following discharge   - Consider OT consult to assist with ADL evaluation and planning for discharge  - Provide patient education as appropriate  Outcome: Progressing  Goal: Maintains/Returns to pre admission functional level  Description: INTERVENTIONS:  - Perform AM-PAC 6 Click Basic Mobility/ Daily Activity assessment daily.  - Set and communicate daily mobility goal to care team and patient/family/caregiver. - Collaborate with rehabilitation services on mobility goals if consulted  - Perform Range of Motion 3 times a day. - Reposition patient every 2 hours.   - Dangle patient 2 times a day  - Stand patient 2 times a day  - Ambulate patient 2 times a day  - Out of bed to chair 2 times a day   - Out of bed for meals 2 times a day  - Out of bed for toileting  - Record patient progress and toleration of activity level   Outcome: Progressing     Problem: DISCHARGE PLANNING  Goal: Discharge to home or other facility with appropriate resources  Description: INTERVENTIONS:  - Identify barriers to discharge w/patient and caregiver  - Arrange for needed discharge resources and transportation as appropriate  - Identify discharge learning needs (meds, wound care, etc.)  - Arrange for interpretive services to assist at discharge as needed  - Refer to Case Management Department for coordinating discharge planning if the patient needs post-hospital services based on physician/advanced practitioner order or complex needs related to functional status, cognitive ability, or social support system  Outcome: Progressing     Problem: Knowledge Deficit  Goal: Patient/family/caregiver demonstrates understanding of disease process, treatment plan, medications, and discharge instructions  Description: Complete learning assessment and assess knowledge base.   Interventions:  - Provide teaching at level of understanding  - Provide teaching via preferred learning methods  Outcome: Progressing

## 2023-11-18 NOTE — DISCHARGE SUMMARY
[address] [facility]  Discharge- Barb Mccartney 1950, 68 y.o. male MRN: [id number]  Unit/Bed#: MS [facility] Encounter: [id number]  Primary Care Provider: Kayla Rosado MD   Date and time admitted to hospital: 11/16/2023  2:27 PM    * Hypertensive urgency  Assessment & Plan  POA with dizziness overnight after standing up too quickly. Has been checking BP at home with -180  Candesartan-hctz at home - can continue inpatient   Given labetalol 20mg once in ED and BP dropped from 220 to 130 SBP with bradycardia 50s  Hydralazine prn   Echo 60% EF  EKG with chronic ischemic changes. No chest pain and no troponin elevations  Orthostatics normal  Increase procardia to 60mg daily  Follow up with PCP for BP management     Benign prostatic hyperplasia without lower urinary tract symptoms  Assessment & Plan  Continue cialis. Follows with urology    Type 2 diabetes mellitus without complication, with long-term current use of insulin [facility])  Assessment & Plan  Lab Results   Component Value Date    HGBA1C 7.3 (H) 10/24/2023       Recent Labs     11/17/23  1610 11/17/23  2047 11/18/23  0730 11/18/23  1110   POCGLU 148* 213* 146* 196*         Blood Sugar Average: Last 72 hrs:  Continue home  Lantus 15 units twice daily, and Humalog sliding scale 3 times daily with meals  Patient reports that his sliding scale is usually 15-25 units TID with meals at home.  Will add on 5 units humalog TID with meals while inpatient  Hypoglycemia protocol      Medical Problems       Resolved Problems  Date Reviewed: 11/18/2023   None       Discharging Physician / Practitioner: Shirley Ledesma PACristalC  PCP: Kayla Rosado MD  Admission Date:   Admission Orders (From admission, onward)       Ordered        11/16/23 57 [facility]  Once                          Discharge Date: 11/18/23    Consultations During Hospital Stay:  None    Procedures Performed:   None    Significant Findings / Test Results:   CT head showing no acute intracranial normality. Chronic microangiopathic changes. Incidental Findings:   None     Test Results Pending at Discharge (will require follow up): None     Outpatient Tests Requested:  None    Complications:  None    Reason for Admission: hypertensive urgency     Hospital Course:   Sky Naranjo is a 68 y.o. male patient who originally presented to the hospital on 11/16/2023 due to dizziness. Patient was found to have hypertensive urgency and was treated with IV labetalol in ED which dropped patient's blood pressure and heart rate. Upon arrival to the floor, monitored blood pressure. Started Procardia 30 mg the following day. Blood pressure lowered by time of discharge and patient remains asymptomatic. Patient is to follow-up with PCP for blood pressure management. Please see above list of diagnoses and related plan for additional information. Condition at Discharge: good    Discharge Day Visit / Exam:   Subjective: Patient states that he is feeling well today. Denies chest pain, shortness of breath, dizziness, headaches. Patient agreeable to discharge home today and follow-up with PCP for blood pressure management. Vitals: Blood Pressure: 145/79 (11/18/23 1109)  Pulse: 55 (11/18/23 1109)  Temperature: 98.2 °F (36.8 °C) (11/17/23 2240)  Temp Source: Temporal (11/16/23 1838)  Respirations: 19 (11/18/23 0731)  Height: 5' 8" (172.7 cm) (11/17/23 0800)  Weight - Scale: 96.2 kg (212 lb) (11/17/23 0800)  SpO2: 97 % (11/18/23 1109)  Exam:   Physical Exam  Vitals reviewed. Constitutional:       General: He is not in acute distress. Appearance: Normal appearance. He is obese. He is not ill-appearing. HENT:      Head: Normocephalic and atraumatic. Nose: Nose normal.      Mouth/Throat:      Mouth: Mucous membranes are moist.      Pharynx: Oropharynx is clear. Eyes:      Extraocular Movements: Extraocular movements intact.       Conjunctiva/sclera: Conjunctivae normal. Cardiovascular:      Rate and Rhythm: Normal rate and regular rhythm. Pulses: Normal pulses. Heart sounds: Normal heart sounds. No murmur heard. Pulmonary:      Effort: Pulmonary effort is normal. No respiratory distress. Breath sounds: Normal breath sounds. No wheezing. Abdominal:      General: Abdomen is flat. Bowel sounds are normal. There is no distension. Palpations: Abdomen is soft. Tenderness: There is no abdominal tenderness. There is no guarding. Musculoskeletal:         General: Normal range of motion. Cervical back: Normal range of motion. Right lower leg: No edema. Left lower leg: No edema. Skin:     General: Skin is warm. Neurological:      General: No focal deficit present. Mental Status: He is alert and oriented to person, place, and time. Mental status is at baseline. Motor: No weakness. Psychiatric:         Mood and Affect: Mood normal.         Behavior: Behavior normal.         Thought Content: Thought content normal.         Judgment: Judgment normal.          Discussion with Family: Updated  (wife) at bedside. Discharge instructions/Information to patient and family:   See after visit summary for information provided to patient and family. Provisions for Follow-Up Care:  See after visit summary for information related to follow-up care and any pertinent home health orders. Mobility at time of Discharge:   Basic Mobility Inpatient Raw Score: 24  JH-HLM Goal: 8: Walk 250 feet or more  JH-HLM Achieved: 8: Walk 250 feet ot more  HLM Goal achieved. Continue to encourage appropriate mobility. Disposition:   Home    Planned Readmission: None     Discharge Statement:  I spent 45 minutes discharging the patient. This time was spent on the day of discharge. I had direct contact with the patient on the day of discharge.  Greater than 50% of the total time was spent examining patient, answering all patient questions, arranging and discussing plan of care with patient as well as directly providing post-discharge instructions. Additional time then spent on discharge activities. Discharge Medications:  See after visit summary for reconciled discharge medications provided to patient and/or family.       **Please Note: This note may have been constructed using a voice recognition system**

## 2023-11-18 NOTE — DISCHARGE INSTR - AVS FIRST PAGE
Continue taking candesartan-HCTZ at prior home dose. Start taking Procardia 60 mg daily, starting 11/19. Follow-up with family doctor for blood pressure management.   If symptoms worsen or new symptoms arise, come back to the hospital.

## 2023-11-20 ENCOUNTER — TELEPHONE (OUTPATIENT)
Dept: UROLOGY | Facility: CLINIC | Age: 73
End: 2023-11-20

## 2023-11-20 DIAGNOSIS — R35.1 NOCTURIA: Primary | ICD-10-CM

## 2023-11-20 NOTE — TELEPHONE ENCOUNTER
Call placed to pt to remind to get PSA done prior to next office visit. Pt verbalized understanding. New order placed.

## 2024-02-20 ENCOUNTER — OFFICE VISIT (OUTPATIENT)
Dept: UROLOGY | Facility: CLINIC | Age: 74
End: 2024-02-20
Payer: MEDICARE

## 2024-02-20 VITALS
HEART RATE: 65 BPM | SYSTOLIC BLOOD PRESSURE: 128 MMHG | WEIGHT: 219.2 LBS | DIASTOLIC BLOOD PRESSURE: 62 MMHG | BODY MASS INDEX: 33.33 KG/M2 | TEMPERATURE: 97.4 F | OXYGEN SATURATION: 98 %

## 2024-02-20 DIAGNOSIS — R35.1 BENIGN PROSTATIC HYPERPLASIA WITH NOCTURIA: ICD-10-CM

## 2024-02-20 DIAGNOSIS — R35.1 NOCTURIA: Primary | ICD-10-CM

## 2024-02-20 DIAGNOSIS — R97.20 ELEVATED PSA: ICD-10-CM

## 2024-02-20 DIAGNOSIS — N40.1 BENIGN PROSTATIC HYPERPLASIA WITH NOCTURIA: ICD-10-CM

## 2024-02-20 LAB
SL AMB  POCT GLUCOSE, UA: ABNORMAL
SL AMB LEUKOCYTE ESTERASE,UA: ABNORMAL
SL AMB POCT BILIRUBIN,UA: ABNORMAL
SL AMB POCT BLOOD,UA: ABNORMAL
SL AMB POCT CLARITY,UA: CLEAR
SL AMB POCT COLOR,UA: YELLOW
SL AMB POCT KETONES,UA: ABNORMAL
SL AMB POCT NITRITE,UA: ABNORMAL
SL AMB POCT PH,UA: 5.5
SL AMB POCT SPECIFIC GRAVITY,UA: 1.03
SL AMB POCT URINE PROTEIN: ABNORMAL
SL AMB POCT UROBILINOGEN: 1

## 2024-02-20 PROCEDURE — 99213 OFFICE O/P EST LOW 20 MIN: CPT | Performed by: UROLOGY

## 2024-02-20 PROCEDURE — 81003 URINALYSIS AUTO W/O SCOPE: CPT | Performed by: UROLOGY

## 2024-02-20 RX ORDER — TAMSULOSIN HYDROCHLORIDE 0.4 MG/1
0.4 CAPSULE ORAL
Qty: 90 CAPSULE | Refills: 3 | Status: SHIPPED | OUTPATIENT
Start: 2024-02-20

## 2024-02-20 NOTE — PROGRESS NOTES
UROLOGY PROGRESS NOTE         NAME: Michael Steven  AGE: 73 y.o. SEX: male  : 1950   MRN: [id number]    DATE: 2024  TIME: 12:01 PM    Assessment and Plan      Impression:   1. Nocturia  -     POCT urine dip auto non-scope    2. Elevated PSA    3. Benign prostatic hyperplasia with nocturia    Discussed elevated PSA elevated risk for having prostate cancer and what it may mean.  His BPH symptoms are bothersome.  He is willing to give Flomax to try again.  He is also would like to get an MRI.     Plan: MRI of the prostate, start Flomax, discontinue the Cialis.  Will see him back to see how things progress.  Consider prostate biopsies based on his MRI.      Chief Complaint     Chief Complaint   Patient presents with    Follow-up     History of Present Illness     HPI: Michael Steven is a 73 y.o. year old male who presents with BPH.  He is also has a history of erectile dysfunction has been on Cialis.  The Cialis did initially help his erectile dysfunction but more recently he is not having the same benefits.  After discussing the options he is content to discontinue the Cialis and not proceed with any additional treatments for his erectile dysfunction.  After discussing the options for BPH he is willing to start Flomax and wants to give that a chance.  He currently has nocturia 3-5 times per night he has urgency of urination sometimes.  He has some urinary frequency at times and he does not always feel that he empties his bladder.  His PSA was recently elevated for the first time just over 5.  We discussed what this means.              The following portions of the patient's history were reviewed and updated as appropriate: allergies, current medications, past family history, past medical history, past social history, past surgical history and problem list.  Past Medical History:   Diagnosis Date    Arthritis     BPH (benign prostatic hyperplasia)     Diabetes mellitus (HCC)     Hypertension      Nocturia      Past Surgical History:   Procedure Laterality Date    EYE SURGERY      KNEE ARTHROSCOPY       shoulder  Review of Systems     Const: Denies chills, fever and weight loss.  CV: Denies chest pain.  Resp: Denies SOB.  GI: Denies abdominal pain, nausea and vomiting.  : Denies symptoms other than stated above.  Musculo: Denies back pain.    Objective   /62 (BP Location: Left arm, Patient Position: Sitting)   Pulse 65   Temp (!) 97.4 °F (36.3 °C)   Wt 99.4 kg (219 lb 3.2 oz)   SpO2 98%   BMI 33.33 kg/m²     Physical Exam  Const: Appears healthy and well developed. No signs of acute distress present.  Resp: Respirations are regular and unlabored.   CV: Rate is regular. Rhythm is regular.  Abdomen: Abdomen is soft, nontender, and nondistended. Kidneys are not palpable.  : Left moderate hydrocele.  No hernias.  Prostate 1+ benign.  Penis otherwise normal.  Psych: Patient's attitude is cooperative. Mood is normal. Affect is normal.    Current Medications     Current Outpatient Medications:     Candesartan Cilexetil-HCTZ 32-25 MG TABS, Take 1 tablet by mouth in the morning, Disp: , Rfl:     LANTUS SOLOSTAR 100 units/mL injection pen, 15 Units 2 (two) times a day, Disp: , Rfl:     metFORMIN (GLUCOPHAGE) 1000 MG tablet, 1,000 mg 2 (two) times a day with meals, Disp: , Rfl:     NIFEdipine (PROCARDIA XL) 30 mg 24 hr tablet, Take 2 tablets (60 mg total) by mouth daily Do not start before November 19, 2023., Disp: 60 tablet, Rfl: 0    NOVOLOG FLEXPEN 100 units/mL injection pen, Inject under the skin 4 (four) times a day Sliding scale with meals and at bedtime, Disp: , Rfl:     pantoprazole (PROTONIX) 40 mg tablet, Take 40 mg by mouth daily, Disp: , Rfl:     rosuvastatin (CRESTOR) 20 MG tablet, Take 20 mg by mouth daily, Disp: , Rfl:     tadalafil (CIALIS) 5 MG tablet, Take 1 tablet (5 mg total) by mouth daily (Patient taking differently: Take 5 mg by mouth if needed for erectile dysfunction), Disp: 90  tablet, Rfl: 3        Frank D'Amico, MD

## 2024-03-15 ENCOUNTER — HOSPITAL ENCOUNTER (OUTPATIENT)
Dept: MRI IMAGING | Facility: HOSPITAL | Age: 74
End: 2024-03-15
Attending: UROLOGY
Payer: MEDICARE

## 2024-03-15 DIAGNOSIS — R97.20 ELEVATED PSA: ICD-10-CM

## 2024-03-15 DIAGNOSIS — N40.1 BENIGN PROSTATIC HYPERPLASIA WITH NOCTURIA: ICD-10-CM

## 2024-03-15 DIAGNOSIS — R35.1 BENIGN PROSTATIC HYPERPLASIA WITH NOCTURIA: ICD-10-CM

## 2024-03-15 PROCEDURE — 72197 MRI PELVIS W/O & W/DYE: CPT

## 2024-03-15 PROCEDURE — 76377 3D RENDER W/INTRP POSTPROCES: CPT

## 2024-03-15 PROCEDURE — A9585 GADOBUTROL INJECTION: HCPCS | Performed by: UROLOGY

## 2024-03-15 PROCEDURE — G1004 CDSM NDSC: HCPCS

## 2024-03-15 RX ORDER — GADOBUTROL 604.72 MG/ML
10 INJECTION INTRAVENOUS
Status: COMPLETED | OUTPATIENT
Start: 2024-03-15 | End: 2024-03-15

## 2024-03-15 RX ADMIN — GADOBUTROL 10 ML: 604.72 INJECTION INTRAVENOUS at 14:35

## 2024-03-21 ENCOUNTER — OFFICE VISIT (OUTPATIENT)
Dept: UROLOGY | Facility: CLINIC | Age: 74
End: 2024-03-21
Payer: MEDICARE

## 2024-03-21 VITALS
WEIGHT: 214 LBS | OXYGEN SATURATION: 98 % | HEIGHT: 68 IN | DIASTOLIC BLOOD PRESSURE: 52 MMHG | SYSTOLIC BLOOD PRESSURE: 132 MMHG | TEMPERATURE: 98 F | HEART RATE: 56 BPM | BODY MASS INDEX: 32.43 KG/M2

## 2024-03-21 DIAGNOSIS — N18.31 TYPE 2 DIABETES MELLITUS WITH STAGE 3A CHRONIC KIDNEY DISEASE, WITH LONG-TERM CURRENT USE OF INSULIN (HCC): ICD-10-CM

## 2024-03-21 DIAGNOSIS — R97.20 ELEVATED PSA: Primary | ICD-10-CM

## 2024-03-21 DIAGNOSIS — R35.1 BENIGN PROSTATIC HYPERPLASIA WITH NOCTURIA: ICD-10-CM

## 2024-03-21 DIAGNOSIS — N40.1 BENIGN PROSTATIC HYPERPLASIA WITH NOCTURIA: ICD-10-CM

## 2024-03-21 DIAGNOSIS — Z79.4 TYPE 2 DIABETES MELLITUS WITH STAGE 3A CHRONIC KIDNEY DISEASE, WITH LONG-TERM CURRENT USE OF INSULIN (HCC): ICD-10-CM

## 2024-03-21 DIAGNOSIS — E11.22 TYPE 2 DIABETES MELLITUS WITH STAGE 3A CHRONIC KIDNEY DISEASE, WITH LONG-TERM CURRENT USE OF INSULIN (HCC): ICD-10-CM

## 2024-03-21 PROCEDURE — 99214 OFFICE O/P EST MOD 30 MIN: CPT | Performed by: UROLOGY

## 2024-03-21 RX ORDER — NIFEDIPINE 60 MG/1
60 TABLET, EXTENDED RELEASE ORAL DAILY
COMMUNITY
Start: 2024-03-19

## 2024-03-21 RX ORDER — DUTASTERIDE 0.5 MG/1
0.5 CAPSULE, LIQUID FILLED ORAL DAILY
Qty: 90 CAPSULE | Refills: 3 | Status: SHIPPED | OUTPATIENT
Start: 2024-03-21

## 2024-03-21 NOTE — PROGRESS NOTES
UROLOGY PROGRESS NOTE         NAME: Michael Steven  AGE: 73 y.o. SEX: male  : 1950   MRN: [id number]    DATE: 3/21/2024  TIME: 10:57 AM    Assessment and Plan      Impression:   1. Elevated PSA    2. Benign prostatic hyperplasia with nocturia    Patient has a low risk for having prostate cancer.  This is based on his MRI and his PSA level.  He has significant BPH symptoms better on the Flomax.  We talked about the different options for treating BPH and we will maximize his medication by adding Avodart.     Plan: Follow his PSA along.  Will get him started on Avodart which will also likely lower his PSA.  Will see him in 6 months.  He would like to hold on treating his hydrocele for now we discussed this as well.  He will continue the Flomax.      Chief Complaint     Chief Complaint   Patient presents with    Follow up    Nocturia    Benign Prostatic Hypertrophy     History of Present Illness     HPI: Michael Steven is a 73 y.o. year old male who presents with history of an elevated PSA.  History of significant BPH symptoms.  His flow is better he feels that he empties better but he is starting getting up for 5 times at night and voiding every hour and a half during the day.  We discussed the different treatments for BPH.  It would be wise I think to avoid procedure if we can.  Will get him started on Avodart in addition to the Flomax.  His MRI revealed for PI-RADS score of 2 with an 83 cc prostate gland.  Low risk for having prostate cancer.  He has had his hydrocele for 20 years.  He would like to hold on having this fixed until it bothers him more.              The following portions of the patient's history were reviewed and updated as appropriate: allergies, current medications, past family history, past medical history, past social history, past surgical history and problem list.  Past Medical History:   Diagnosis Date    Arthritis     BPH (benign prostatic hyperplasia)     Diabetes mellitus  "(HCC)     Hypertension     Nocturia      Past Surgical History:   Procedure Laterality Date    EYE SURGERY      KNEE ARTHROSCOPY       shoulder  Review of Systems     Const: Denies chills, fever and weight loss.  CV: Denies chest pain.  Resp: Denies SOB.  GI: Denies abdominal pain, nausea and vomiting.  : Denies symptoms other than stated above.  Musculo: Denies back pain.    Objective   /52   Pulse 56   Temp 98 °F (36.7 °C)   Ht 5' 8\" (1.727 m)   Wt 97.1 kg (214 lb)   SpO2 98%   BMI 32.54 kg/m²     Physical Exam  Const: Appears healthy and well developed. No signs of acute distress present.  Psych: Patient's attitude is cooperative. Mood is normal. Affect is normal.    Current Medications     Current Outpatient Medications:     Candesartan Cilexetil-HCTZ 32-25 MG TABS, Take 1 tablet by mouth in the morning, Disp: , Rfl:     LANTUS SOLOSTAR 100 units/mL injection pen, 15 Units 2 (two) times a day, Disp: , Rfl:     metFORMIN (GLUCOPHAGE) 1000 MG tablet, 1,000 mg 2 (two) times a day with meals, Disp: , Rfl:     NIFEdipine (PROCARDIA XL) 60 mg 24 hr tablet, Take 60 mg by mouth daily, Disp: , Rfl:     NOVOLOG FLEXPEN 100 units/mL injection pen, Inject under the skin 4 (four) times a day Sliding scale with meals and at bedtime, Disp: , Rfl:     pantoprazole (PROTONIX) 40 mg tablet, Take 40 mg by mouth daily, Disp: , Rfl:     rosuvastatin (CRESTOR) 20 MG tablet, Take 20 mg by mouth daily, Disp: , Rfl:     tamsulosin (FLOMAX) 0.4 mg, Take 1 capsule (0.4 mg total) by mouth daily with dinner, Disp: 90 capsule, Rfl: 3    NIFEdipine (PROCARDIA XL) 30 mg 24 hr tablet, Take 2 tablets (60 mg total) by mouth daily Do not start before November 19, 2023., Disp: 60 tablet, Rfl: 0    tadalafil (CIALIS) 5 MG tablet, Take 1 tablet (5 mg total) by mouth daily (Patient not taking: Reported on 3/21/2024), Disp: 90 tablet, Rfl: 3        Frank D'Amico, MD        "

## 2024-06-21 ENCOUNTER — DOCTOR'S OFFICE (OUTPATIENT)
Dept: URBAN - NONMETROPOLITAN AREA CLINIC 1 | Facility: CLINIC | Age: 74
Setting detail: OPHTHALMOLOGY
End: 2024-06-21
Payer: MEDICARE

## 2024-06-21 DIAGNOSIS — E11.9: ICD-10-CM

## 2024-06-21 DIAGNOSIS — H25.13: ICD-10-CM

## 2024-06-21 DIAGNOSIS — H25.013: ICD-10-CM

## 2024-06-21 DIAGNOSIS — H35.373: ICD-10-CM

## 2024-06-21 PROCEDURE — 92014 COMPRE OPH EXAM EST PT 1/>: CPT | Performed by: OPHTHALMOLOGY

## 2024-06-21 PROCEDURE — 92134 CPTRZ OPH DX IMG PST SGM RTA: CPT | Performed by: OPHTHALMOLOGY

## 2024-06-21 ASSESSMENT — CONFRONTATIONAL VISUAL FIELD TEST (CVF)
OS_FINDINGS: FULL
OD_FINDINGS: FULL

## 2024-06-21 ASSESSMENT — LID POSITION - DERMATOCHALASIS
OS_DERMATOCHALASIS: LUL 1+
OD_DERMATOCHALASIS: RUL 1+

## 2024-07-16 ENCOUNTER — TELEPHONE (OUTPATIENT)
Dept: UROLOGY | Facility: CLINIC | Age: 74
End: 2024-07-16

## 2024-07-17 ENCOUNTER — TELEPHONE (OUTPATIENT)
Dept: UROLOGY | Facility: CLINIC | Age: 74
End: 2024-07-17

## 2024-07-17 NOTE — TELEPHONE ENCOUNTER
Left message for patient to call back and reschedule 9/23/24 appointment third out reach. Mailed letter unable to contact patient

## 2024-07-22 NOTE — TELEPHONE ENCOUNTER
Pt called back to reschedule f/u appt with Dr. D'Amico.  Pt was rescheduled on 10/16/24.    Pt call back: [phone]

## 2024-08-29 DIAGNOSIS — N40.1 BENIGN PROSTATIC HYPERPLASIA WITH NOCTURIA: ICD-10-CM

## 2024-08-29 DIAGNOSIS — R97.20 ELEVATED PSA: ICD-10-CM

## 2024-08-29 DIAGNOSIS — R35.1 BENIGN PROSTATIC HYPERPLASIA WITH NOCTURIA: ICD-10-CM

## 2024-08-29 NOTE — TELEPHONE ENCOUNTER
Patient walked into the office today requesting these 2 scripts to be sent to [facility] in [place].    Tamsulosin HCL 0.4 mg 1 daily 90 day supply  Dutasteride 0.5 mg 1 daily 90 day supply.    He asked if they could just be on file at the pharmacy and he will contact them when ready to refill.

## 2024-08-30 RX ORDER — TAMSULOSIN HYDROCHLORIDE 0.4 MG/1
0.4 CAPSULE ORAL
Qty: 90 CAPSULE | Refills: 3 | OUTPATIENT
Start: 2024-08-30

## 2024-08-30 RX ORDER — DUTASTERIDE 0.5 MG/1
0.5 CAPSULE, LIQUID FILLED ORAL DAILY
Qty: 90 CAPSULE | Refills: 3 | OUTPATIENT
Start: 2024-08-30

## 2024-10-04 ENCOUNTER — APPOINTMENT (OUTPATIENT)
Dept: LAB | Facility: HOSPITAL | Age: 74
End: 2024-10-04
Payer: MEDICARE

## 2024-10-04 DIAGNOSIS — N40.1 BENIGN PROSTATIC HYPERPLASIA WITH NOCTURIA: ICD-10-CM

## 2024-10-04 DIAGNOSIS — R97.20 ELEVATED PSA: ICD-10-CM

## 2024-10-04 DIAGNOSIS — R35.1 BENIGN PROSTATIC HYPERPLASIA WITH NOCTURIA: ICD-10-CM

## 2024-10-04 LAB — PSA SERPL-MCNC: 2.95 NG/ML (ref 0–4)

## 2024-10-04 PROCEDURE — 36415 COLL VENOUS BLD VENIPUNCTURE: CPT

## 2024-10-04 PROCEDURE — 84153 ASSAY OF PSA TOTAL: CPT

## 2024-10-15 RX ORDER — OLMESARTAN MEDOXOMIL AND HYDROCHLOROTHIAZIDE 40/25 40; 25 MG/1; MG/1
1 TABLET ORAL DAILY
COMMUNITY
Start: 2024-08-29

## 2024-10-16 ENCOUNTER — OFFICE VISIT (OUTPATIENT)
Dept: UROLOGY | Facility: CLINIC | Age: 74
End: 2024-10-16
Payer: MEDICARE

## 2024-10-16 VITALS
SYSTOLIC BLOOD PRESSURE: 140 MMHG | DIASTOLIC BLOOD PRESSURE: 62 MMHG | OXYGEN SATURATION: 97 % | HEART RATE: 60 BPM | WEIGHT: 223 LBS | HEIGHT: 68 IN | TEMPERATURE: 97.1 F | BODY MASS INDEX: 33.8 KG/M2

## 2024-10-16 DIAGNOSIS — R35.1 BENIGN PROSTATIC HYPERPLASIA WITH NOCTURIA: ICD-10-CM

## 2024-10-16 DIAGNOSIS — N40.1 BENIGN PROSTATIC HYPERPLASIA WITH NOCTURIA: ICD-10-CM

## 2024-10-16 DIAGNOSIS — R97.20 ELEVATED PSA: ICD-10-CM

## 2024-10-16 PROCEDURE — 99214 OFFICE O/P EST MOD 30 MIN: CPT | Performed by: UROLOGY

## 2024-10-16 RX ORDER — DUTASTERIDE 0.5 MG/1
0.5 CAPSULE, LIQUID FILLED ORAL DAILY
Qty: 90 CAPSULE | Refills: 3 | Status: SHIPPED | OUTPATIENT
Start: 2024-10-16

## 2024-10-16 RX ORDER — TAMSULOSIN HYDROCHLORIDE 0.4 MG/1
0.4 CAPSULE ORAL
Qty: 90 CAPSULE | Refills: 3 | Status: SHIPPED | OUTPATIENT
Start: 2024-10-16

## 2024-10-16 NOTE — PROGRESS NOTES
UROLOGY PROGRESS NOTE         NAME: Michael Steven  AGE: 74 y.o. SEX: male  : 1950   MRN: [id number]    DATE: 10/16/2024  TIME: 11:12 AM    Assessment and Plan      Impression:   1. Elevated PSA  2. Benign prostatic hyperplasia with nocturia    Doing well from urologic standpoint.  Still a lot of urinary frequency which he attributes to his diabetes.   Plan: We will see him in a year with a PSA.  Continue Flomax and Avodart.      Chief Complaint     Chief Complaint   Patient presents with    Elevated PSA     History of Present Illness     HPI: Michael Steven is a 74 y.o. year old male who presents with history of BPH, history of elevated PSA.  He had a negative MRI with a large prostate of 83 cc.  Phi RADS score of 2.  He was started on Flomax and Avodart roughly 6 months ago.  His flow is better.  Less urgency.  He still gets up numerous times per night but he attributes this to his diabetes.  Typically he voids a significant volume at night but not always.  Flow is more steady as well.  Should improve with time.  He would like to hold off on any additional intervention at this point.  He does have a large left hydrocele which she has had for a number years.  Not bothersome.  He also does not wish any intervention on that for now.              The following portions of the patient's history were reviewed and updated as appropriate: allergies, current medications, past family history, past medical history, past social history, past surgical history and problem list.  Past Medical History:   Diagnosis Date    Arthritis     BPH (benign prostatic hyperplasia)     Diabetes mellitus (HCC)     Hypertension     Nocturia      Past Surgical History:   Procedure Laterality Date    EYE SURGERY      KNEE ARTHROSCOPY       shoulder  Review of Systems     Const: Denies chills, fever and weight loss.  CV: Denies chest pain.  Resp: Denies SOB.  GI: Denies abdominal pain, nausea and vomiting.  : Denies symptoms  "other than stated above.  Musculo: Denies back pain.    Objective   /62   Pulse 60   Temp (!) 97.1 °F (36.2 °C)   Ht 5' 8\" (1.727 m)   Wt 101 kg (223 lb)   SpO2 97%   BMI 33.91 kg/m²     Physical Exam  Const: Appears healthy and well developed. No signs of acute distress present.  Resp: Respirations are regular and unlabored.   CV: Rate is regular. Rhythm is regular.  Abdomen: Abdomen is soft, nontender, and nondistended. Kidneys are not palpable.  : Penis and testicles normal.  Large left hydrocele.  Prostate 1+ benign.  No hernias.  Psych: Patient's attitude is cooperative. Mood is normal. Affect is normal.    Current Medications     Current Outpatient Medications:     dutasteride (AVODART) 0.5 mg capsule, Take 1 capsule (0.5 mg total) by mouth daily, Disp: 90 capsule, Rfl: 3    LANTUS SOLOSTAR 100 units/mL injection pen, 20 Units 2 (two) times a day, Disp: , Rfl:     metFORMIN (GLUCOPHAGE) 1000 MG tablet, 1,000 mg 2 (two) times a day with meals, Disp: , Rfl:     NIFEdipine (PROCARDIA XL) 60 mg 24 hr tablet, Take 60 mg by mouth daily, Disp: , Rfl:     NOVOLOG FLEXPEN 100 units/mL injection pen, Inject under the skin 4 (four) times a day Sliding scale with meals and at bedtime, Disp: , Rfl:     olmesartan-hydrochlorothiazide (BENICAR HCT) 40-25 MG per tablet, Take 1 tablet by mouth daily, Disp: , Rfl:     pantoprazole (PROTONIX) 40 mg tablet, Take 40 mg by mouth daily, Disp: , Rfl:     rosuvastatin (CRESTOR) 20 MG tablet, Take 20 mg by mouth daily, Disp: , Rfl:     tamsulosin (FLOMAX) 0.4 mg, Take 1 capsule (0.4 mg total) by mouth daily with dinner, Disp: 90 capsule, Rfl: 3        Frank D'Amico, MD        "

## 2025-04-02 PROBLEM — I35.0 SEVERE AORTIC STENOSIS: Status: ACTIVE | Noted: 2025-04-02

## 2025-06-27 ENCOUNTER — DOCTOR'S OFFICE (OUTPATIENT)
Dept: URBAN - NONMETROPOLITAN AREA CLINIC 1 | Facility: CLINIC | Age: 75
Setting detail: OPHTHALMOLOGY
End: 2025-06-27
Payer: MEDICARE

## 2025-06-27 DIAGNOSIS — H25.13: ICD-10-CM

## 2025-06-27 DIAGNOSIS — H35.373: ICD-10-CM

## 2025-06-27 DIAGNOSIS — H40.013: ICD-10-CM

## 2025-06-27 DIAGNOSIS — H25.013: ICD-10-CM

## 2025-06-27 DIAGNOSIS — E11.3293: ICD-10-CM

## 2025-06-27 PROBLEM — H35.40 PERIPAPILLARY ATROPHY: Status: ACTIVE | Noted: 2025-06-27

## 2025-06-27 PROCEDURE — 92134 CPTRZ OPH DX IMG PST SGM RTA: CPT | Performed by: OPHTHALMOLOGY

## 2025-06-27 PROCEDURE — 99214 OFFICE O/P EST MOD 30 MIN: CPT | Performed by: OPHTHALMOLOGY

## 2025-06-27 ASSESSMENT — LID POSITION - DERMATOCHALASIS
OD_DERMATOCHALASIS: RUL 1+
OS_DERMATOCHALASIS: LUL 1+

## 2025-06-27 ASSESSMENT — REFRACTION_MANIFEST
OD_SPHERE: -1.00
OD_ADD: +2.50
OD_CYLINDER: -1.25
OS_ADD: +2.50
OD_AXIS: 075
OS_CYLINDER: -0.25
OS_AXIS: 010
OS_VPRISM: BD
OD_VPRISM: BU
OS_VA1: 20/25-2
OS_SPHERE: -0.50
OD_VA1: 20/30+2

## 2025-06-27 ASSESSMENT — REFRACTION_CURRENTRX
OS_ADD: +2.50
OD_OVR_VA: 20/
OD_ADD: +2.50
OD_CYLINDER: -1.25
OS_VPRISM: BD
OD_VPRISM_DIRECTION: PROGS
OD_AXIS: 075
OS_AXIS: 010
OD_VPRISM: BU
OS_OVR_VA: 20/
OD_SPHERE: -1.00
OS_CYLINDER: -0.25
OS_VPRISM_DIRECTION: PROGS
OS_SPHERE: -0.50

## 2025-06-27 ASSESSMENT — CONFRONTATIONAL VISUAL FIELD TEST (CVF)
OD_FINDINGS: FULL
OS_FINDINGS: FULL

## 2025-06-27 ASSESSMENT — KERATOMETRY
OD_K1POWER_DIOPTERS: 42.25
OS_K1POWER_DIOPTERS: 42.50
OS_AXISANGLE_DEGREES: 094
OS_K2POWER_DIOPTERS: 42.75
OD_K2POWER_DIOPTERS: 43.50
OD_AXISANGLE_DEGREES: 116

## 2025-06-27 ASSESSMENT — REFRACTION_AUTOREFRACTION
OD_SPHERE: -1.50
OD_AXIS: 016
OD_CYLINDER: -1.25
OS_SPHERE: -0.25
OS_CYLINDER: -0.50
OS_AXIS: 117

## 2025-06-27 ASSESSMENT — VISUAL ACUITY
OD_BCVA: 20/40-1
OS_BCVA: 20/60

## 2025-08-28 ENCOUNTER — DOCTOR'S OFFICE (OUTPATIENT)
Dept: URBAN - NONMETROPOLITAN AREA CLINIC 1 | Facility: CLINIC | Age: 75
Setting detail: OPHTHALMOLOGY
End: 2025-08-28
Payer: MEDICARE

## 2025-08-28 DIAGNOSIS — H25.011: ICD-10-CM

## 2025-08-28 DIAGNOSIS — H25.11: ICD-10-CM

## 2025-08-28 PROCEDURE — 92136 OPHTHALMIC BIOMETRY: CPT | Mod: RT | Performed by: OPHTHALMOLOGY

## 2025-08-28 ASSESSMENT — REFRACTION_AUTOREFRACTION
OD_SPHERE: -1.50
OD_AXIS: 016
OS_SPHERE: -0.25
OD_CYLINDER: -1.25
OS_AXIS: 117
OS_CYLINDER: -0.50

## 2025-08-28 ASSESSMENT — REFRACTION_MANIFEST
OD_AXIS: 075
OS_VA1: 20/25-2
OD_CYLINDER: -1.25
OD_VA1: 20/30+2
OS_ADD: +2.50
OS_CYLINDER: -0.25
OD_ADD: +2.50
OS_AXIS: 010
OD_SPHERE: -1.00
OS_VPRISM: BD
OS_SPHERE: -0.50
OD_VPRISM: BU

## 2025-08-28 ASSESSMENT — REFRACTION_CURRENTRX
OS_VPRISM_DIRECTION: PROGS
OD_CYLINDER: -1.25
OD_OVR_VA: 20/
OD_VPRISM: BU
OD_ADD: +2.50
OS_AXIS: 010
OS_OVR_VA: 20/
OS_CYLINDER: -0.25
OD_VPRISM_DIRECTION: PROGS
OD_SPHERE: -1.00
OS_VPRISM: BD
OS_ADD: +2.50
OD_AXIS: 075
OS_SPHERE: -0.50

## 2025-08-28 ASSESSMENT — KERATOMETRY
OS_K1POWER_DIOPTERS: 42.50
OD_AXISANGLE_DEGREES: 116
OD_K1POWER_DIOPTERS: 42.25
OS_K2POWER_DIOPTERS: 42.75
OD_K2POWER_DIOPTERS: 43.50
OS_AXISANGLE_DEGREES: 094

## 2025-08-28 ASSESSMENT — VISUAL ACUITY
OD_BCVA: 20/40-1
OS_BCVA: 20/60